# Patient Record
Sex: FEMALE | Race: OTHER | NOT HISPANIC OR LATINO | URBAN - METROPOLITAN AREA
[De-identification: names, ages, dates, MRNs, and addresses within clinical notes are randomized per-mention and may not be internally consistent; named-entity substitution may affect disease eponyms.]

---

## 2023-12-11 VITALS
HEIGHT: 65 IN | TEMPERATURE: 98 F | SYSTOLIC BLOOD PRESSURE: 139 MMHG | RESPIRATION RATE: 18 BRPM | DIASTOLIC BLOOD PRESSURE: 82 MMHG | HEART RATE: 75 BPM | OXYGEN SATURATION: 98 % | WEIGHT: 169.98 LBS

## 2023-12-11 LAB
ALBUMIN SERPL ELPH-MCNC: 4 G/DL — SIGNIFICANT CHANGE UP (ref 3.3–5)
ALP SERPL-CCNC: 165 U/L — HIGH (ref 40–120)
ALP SERPL-CCNC: SIGNIFICANT CHANGE UP (ref 40–120)
ALT FLD-CCNC: 22 U/L — SIGNIFICANT CHANGE UP (ref 10–45)
ALT FLD-CCNC: SIGNIFICANT CHANGE UP (ref 10–45)
ANION GAP SERPL CALC-SCNC: 11 MMOL/L — SIGNIFICANT CHANGE UP (ref 5–17)
ANION GAP SERPL CALC-SCNC: 12 MMOL/L — SIGNIFICANT CHANGE UP (ref 5–17)
APAP SERPL-MCNC: <5 UG/ML — LOW (ref 10–30)
APPEARANCE UR: CLEAR — SIGNIFICANT CHANGE UP
APTT BLD: 27.5 SEC — SIGNIFICANT CHANGE UP (ref 24.5–35.6)
AST SERPL-CCNC: 16 U/L — SIGNIFICANT CHANGE UP (ref 10–40)
AST SERPL-CCNC: SIGNIFICANT CHANGE UP (ref 10–40)
B-OH-BUTYR SERPL-SCNC: 1.4 MMOL/L — HIGH
BACTERIA # UR AUTO: ABNORMAL /HPF
BASE EXCESS BLDV CALC-SCNC: -4.8 MMOL/L — LOW (ref -2–3)
BASOPHILS # BLD AUTO: 0.03 K/UL — SIGNIFICANT CHANGE UP (ref 0–0.2)
BASOPHILS NFR BLD AUTO: 0.4 % — SIGNIFICANT CHANGE UP (ref 0–2)
BILIRUB SERPL-MCNC: 0.2 MG/DL — SIGNIFICANT CHANGE UP (ref 0.2–1.2)
BILIRUB UR-MCNC: NEGATIVE — SIGNIFICANT CHANGE UP
BUN SERPL-MCNC: 7 MG/DL — SIGNIFICANT CHANGE UP (ref 7–23)
BUN SERPL-MCNC: 8 MG/DL — SIGNIFICANT CHANGE UP (ref 7–23)
CA-I SERPL-SCNC: 1.16 MMOL/L — SIGNIFICANT CHANGE UP (ref 1.15–1.33)
CALCIUM SERPL-MCNC: 8.6 MG/DL — SIGNIFICANT CHANGE UP (ref 8.4–10.5)
CALCIUM SERPL-MCNC: 8.9 MG/DL — SIGNIFICANT CHANGE UP (ref 8.4–10.5)
CAST: 0 /LPF — SIGNIFICANT CHANGE UP (ref 0–4)
CHLORIDE SERPL-SCNC: 102 MMOL/L — SIGNIFICANT CHANGE UP (ref 96–108)
CO2 BLDV-SCNC: 20.7 MMOL/L — LOW (ref 22–26)
CO2 SERPL-SCNC: 19 MMOL/L — LOW (ref 22–31)
COLOR SPEC: YELLOW — SIGNIFICANT CHANGE UP
CREAT SERPL-MCNC: 0.43 MG/DL — LOW (ref 0.5–1.3)
CREAT SERPL-MCNC: 0.46 MG/DL — LOW (ref 0.5–1.3)
DIFF PNL FLD: NEGATIVE — SIGNIFICANT CHANGE UP
EGFR: 123 ML/MIN/1.73M2 — SIGNIFICANT CHANGE UP
EGFR: 125 ML/MIN/1.73M2 — SIGNIFICANT CHANGE UP
EOSINOPHIL # BLD AUTO: 0.1 K/UL — SIGNIFICANT CHANGE UP (ref 0–0.5)
EOSINOPHIL NFR BLD AUTO: 1.2 % — SIGNIFICANT CHANGE UP (ref 0–6)
ETHANOL SERPL-MCNC: <10 MG/DL — SIGNIFICANT CHANGE UP (ref 0–10)
GAS PNL BLDV: 132 MMOL/L — LOW (ref 136–145)
GAS PNL BLDV: SIGNIFICANT CHANGE UP
GLUCOSE SERPL-MCNC: 305 MG/DL — HIGH (ref 70–99)
GLUCOSE SERPL-MCNC: 378 MG/DL — HIGH (ref 70–99)
GLUCOSE UR QL: >=1000 MG/DL
HCG SERPL-ACNC: <0 MIU/ML — SIGNIFICANT CHANGE UP
HCO3 BLDV-SCNC: 20 MMOL/L — LOW (ref 22–29)
HCT VFR BLD CALC: 43.5 % — SIGNIFICANT CHANGE UP (ref 34.5–45)
HGB BLD-MCNC: 14.8 G/DL — SIGNIFICANT CHANGE UP (ref 11.5–15.5)
IMM GRANULOCYTES NFR BLD AUTO: 0.2 % — SIGNIFICANT CHANGE UP (ref 0–0.9)
INR BLD: 0.86 — SIGNIFICANT CHANGE UP (ref 0.85–1.18)
KETONES UR-MCNC: 40 MG/DL
LEUKOCYTE ESTERASE UR-ACNC: ABNORMAL
LYMPHOCYTES # BLD AUTO: 2.69 K/UL — SIGNIFICANT CHANGE UP (ref 1–3.3)
LYMPHOCYTES # BLD AUTO: 32.8 % — SIGNIFICANT CHANGE UP (ref 13–44)
MCHC RBC-ENTMCNC: 31.4 PG — SIGNIFICANT CHANGE UP (ref 27–34)
MCHC RBC-ENTMCNC: 34 GM/DL — SIGNIFICANT CHANGE UP (ref 32–36)
MCV RBC AUTO: 92.4 FL — SIGNIFICANT CHANGE UP (ref 80–100)
MONOCYTES # BLD AUTO: 0.55 K/UL — SIGNIFICANT CHANGE UP (ref 0–0.9)
MONOCYTES NFR BLD AUTO: 6.7 % — SIGNIFICANT CHANGE UP (ref 2–14)
NEUTROPHILS # BLD AUTO: 4.81 K/UL — SIGNIFICANT CHANGE UP (ref 1.8–7.4)
NEUTROPHILS NFR BLD AUTO: 58.7 % — SIGNIFICANT CHANGE UP (ref 43–77)
NITRITE UR-MCNC: NEGATIVE — SIGNIFICANT CHANGE UP
NRBC # BLD: 0 /100 WBCS — SIGNIFICANT CHANGE UP (ref 0–0)
PCO2 BLDV: 34 MMHG — LOW (ref 39–42)
PH BLDV: 7.37 — SIGNIFICANT CHANGE UP (ref 7.32–7.43)
PH UR: 6.5 — SIGNIFICANT CHANGE UP (ref 5–8)
PLATELET # BLD AUTO: 260 K/UL — SIGNIFICANT CHANGE UP (ref 150–400)
PO2 BLDV: 59 MMHG — HIGH (ref 25–45)
POTASSIUM BLDV-SCNC: 4.3 MMOL/L — SIGNIFICANT CHANGE UP (ref 3.5–5.1)
POTASSIUM SERPL-MCNC: 4.3 MMOL/L — SIGNIFICANT CHANGE UP (ref 3.5–5.3)
POTASSIUM SERPL-MCNC: SIGNIFICANT CHANGE UP (ref 3.5–5.3)
POTASSIUM SERPL-SCNC: 4.3 MMOL/L — SIGNIFICANT CHANGE UP (ref 3.5–5.3)
POTASSIUM SERPL-SCNC: SIGNIFICANT CHANGE UP (ref 3.5–5.3)
PROT SERPL-MCNC: 7 G/DL — SIGNIFICANT CHANGE UP (ref 6–8.3)
PROT SERPL-MCNC: 7.9 G/DL — SIGNIFICANT CHANGE UP (ref 6–8.3)
PROT UR-MCNC: NEGATIVE MG/DL — SIGNIFICANT CHANGE UP
PROTHROM AB SERPL-ACNC: 9.8 SEC — SIGNIFICANT CHANGE UP (ref 9.5–13)
RBC # BLD: 4.71 M/UL — SIGNIFICANT CHANGE UP (ref 3.8–5.2)
RBC # FLD: 13.5 % — SIGNIFICANT CHANGE UP (ref 10.3–14.5)
RBC CASTS # UR COMP ASSIST: 6 /HPF — HIGH (ref 0–4)
SALICYLATES SERPL-MCNC: <0.3 MG/DL — LOW (ref 2.8–20)
SAO2 % BLDV: 88.4 % — HIGH (ref 67–88)
SODIUM SERPL-SCNC: 132 MMOL/L — LOW (ref 135–145)
SODIUM SERPL-SCNC: 133 MMOL/L — LOW (ref 135–145)
SP GR SPEC: >1.03 — HIGH (ref 1–1.03)
SQUAMOUS # UR AUTO: 7 /HPF — HIGH (ref 0–5)
UROBILINOGEN FLD QL: 0.2 MG/DL — SIGNIFICANT CHANGE UP (ref 0.2–1)
WBC # BLD: 8.2 K/UL — SIGNIFICANT CHANGE UP (ref 3.8–10.5)
WBC # FLD AUTO: 8.2 K/UL — SIGNIFICANT CHANGE UP (ref 3.8–10.5)
WBC UR QL: 33 /HPF — HIGH (ref 0–5)

## 2023-12-11 PROCEDURE — 70551 MRI BRAIN STEM W/O DYE: CPT | Mod: 26,MA

## 2023-12-11 PROCEDURE — 70450 CT HEAD/BRAIN W/O DYE: CPT | Mod: 26,MA,XU

## 2023-12-11 PROCEDURE — 70498 CT ANGIOGRAPHY NECK: CPT | Mod: 26,MA

## 2023-12-11 PROCEDURE — 99291 CRITICAL CARE FIRST HOUR: CPT

## 2023-12-11 PROCEDURE — 70496 CT ANGIOGRAPHY HEAD: CPT | Mod: 26,MA

## 2023-12-11 PROCEDURE — 0042T: CPT | Mod: MA

## 2023-12-11 PROCEDURE — 93010 ELECTROCARDIOGRAM REPORT: CPT

## 2023-12-11 RX ORDER — ACETAMINOPHEN 500 MG
650 TABLET ORAL ONCE
Refills: 0 | Status: COMPLETED | OUTPATIENT
Start: 2023-12-11 | End: 2023-12-11

## 2023-12-11 RX ORDER — KETOROLAC TROMETHAMINE 30 MG/ML
15 SYRINGE (ML) INJECTION ONCE
Refills: 0 | Status: DISCONTINUED | OUTPATIENT
Start: 2023-12-11 | End: 2023-12-11

## 2023-12-11 RX ORDER — VALPROIC ACID (AS SODIUM SALT) 250 MG/5ML
500 SOLUTION, ORAL ORAL ONCE
Refills: 0 | Status: COMPLETED | OUTPATIENT
Start: 2023-12-11 | End: 2023-12-11

## 2023-12-11 RX ORDER — DIPHENHYDRAMINE HCL 50 MG
25 CAPSULE ORAL ONCE
Refills: 0 | Status: COMPLETED | OUTPATIENT
Start: 2023-12-11 | End: 2023-12-11

## 2023-12-11 RX ORDER — METOCLOPRAMIDE HCL 10 MG
10 TABLET ORAL ONCE
Refills: 0 | Status: COMPLETED | OUTPATIENT
Start: 2023-12-11 | End: 2023-12-11

## 2023-12-11 RX ORDER — INSULIN LISPRO 100/ML
4 VIAL (ML) SUBCUTANEOUS ONCE
Refills: 0 | Status: COMPLETED | OUTPATIENT
Start: 2023-12-11 | End: 2023-12-11

## 2023-12-11 RX ORDER — SODIUM CHLORIDE 9 MG/ML
1000 INJECTION INTRAMUSCULAR; INTRAVENOUS; SUBCUTANEOUS ONCE
Refills: 0 | Status: COMPLETED | OUTPATIENT
Start: 2023-12-11 | End: 2023-12-11

## 2023-12-11 RX ORDER — MAGNESIUM SULFATE 500 MG/ML
2 VIAL (ML) INJECTION ONCE
Refills: 0 | Status: COMPLETED | OUTPATIENT
Start: 2023-12-11 | End: 2023-12-11

## 2023-12-11 RX ORDER — INSULIN LISPRO 100/ML
6 VIAL (ML) SUBCUTANEOUS ONCE
Refills: 0 | Status: COMPLETED | OUTPATIENT
Start: 2023-12-11 | End: 2023-12-11

## 2023-12-11 RX ADMIN — Medication 15 MILLIGRAM(S): at 23:15

## 2023-12-11 RX ADMIN — Medication 4 UNIT(S): at 23:16

## 2023-12-11 RX ADMIN — Medication 104 MILLIGRAM(S): at 19:05

## 2023-12-11 RX ADMIN — Medication 650 MILLIGRAM(S): at 19:04

## 2023-12-11 RX ADMIN — Medication 55 MILLIGRAM(S): at 23:06

## 2023-12-11 RX ADMIN — SODIUM CHLORIDE 1000 MILLILITER(S): 9 INJECTION INTRAMUSCULAR; INTRAVENOUS; SUBCUTANEOUS at 19:06

## 2023-12-11 RX ADMIN — Medication 50 GRAM(S): at 19:04

## 2023-12-11 RX ADMIN — SODIUM CHLORIDE 1000 MILLILITER(S): 9 INJECTION INTRAMUSCULAR; INTRAVENOUS; SUBCUTANEOUS at 21:33

## 2023-12-11 NOTE — ED ADULT NURSE NOTE - NSFALLRISKINTERV_ED_ALL_ED
Assistance OOB with selected safe patient handling equipment if applicable/Assistance with ambulation/Communicate fall risk and risk factors to all staff, patient, and family/Monitor gait and stability/Provide visual cue: yellow wristband, yellow gown, etc/Reinforce activity limits and safety measures with patient and family/Call bell, personal items and telephone in reach/Instruct patient to call for assistance before getting out of bed/chair/stretcher/Non-slip footwear applied when patient is off stretcher/[place] to call system/Physically safe environment - no spills, clutter or unnecessary equipment/Purposeful Proactive Rounding/Room/bathroom lighting operational, light cord in reach Assistance OOB with selected safe patient handling equipment if applicable/Assistance with ambulation/Communicate fall risk and risk factors to all staff, patient, and family/Monitor gait and stability/Provide visual cue: yellow wristband, yellow gown, etc/Reinforce activity limits and safety measures with patient and family/Call bell, personal items and telephone in reach/Instruct patient to call for assistance before getting out of bed/chair/stretcher/Non-slip footwear applied when patient is off stretcher/[place] to call system/Physically safe environment - no spills, clutter or unnecessary equipment/Purposeful Proactive Rounding/Room/bathroom lighting operational, light cord in reach

## 2023-12-11 NOTE — ED PROVIDER NOTE - INTERPRETATION
normal sinus rhythm, Normal axis, Normal AK interval and QRS complex. There are no acute ischemic ST or T-wave changes. normal sinus rhythm, Normal axis, Normal MS interval and QRS complex. There are no acute ischemic ST or T-wave changes.

## 2023-12-11 NOTE — ED PROVIDER NOTE - OBJECTIVE STATEMENT
43F PMH DM, hemiplegic migraines p/w L HA for several weeks. Also slurred speech and RUE/RLE numbness since yesterday, unclear exact time of onset. Hx of similar prior. States she has seen neuro and has had MRIs in the past and diagnosed w/ hemiplegic migraine. Denies other systemic symptoms.   Denies vision changes, neck pain, rashes, tinnitus, hearing changes, URI symptoms, f/c, SOB/CP, NVD, abd pain, urinary complaints, black/bloody stool, lifestyle/dietary/medication changes.

## 2023-12-11 NOTE — CONSULT NOTE ADULT - ASSESSMENT
43y Female with PMHx of hemiplegic migraines, DM presents to [facility] ED for R hemiplegic migraine. Pt states she has had her migraine for at least 2 weeks, in left occipital location associated with right face/arm/leg weakness/numbness. Patient states this is her usual presentation of her hemiplegic migraines and goes to PT for it. At times her speech is also impacted by her migraines. Yesterday patient woke up in her USOH. Sometime in later afternoon patient developed paucities in her speech and patient was having difficulties with getting her words out, was frustrated with her speech. Pt states she does have these symptoms with her migraines at times but "it has never been this bad before." Patient states her speech worsens the more she puts effort into speaking. Symptoms persisted today, patient called her neurologist whom recommended her to be evaluated in ED. Last MRI was done in October, was unremarkable per patient. Stroke code called on arrival, NIHSS 9, CT imaging negative for acute intracranial pathology.     Pt presenting with R hemiplegic migraine associated with expressive aphasia, will require further testing to determine if symptoms due to stroke.     - Recommend obtaining MRI brain short stroke protocol to rule out ischemic stroke as cause of symptoms. If negative for infarct, patient will need admission due to right sided weakness.   - recommend Mg 2g IV for hemiplegic migraine treatement  - rest of care per primary team    Case discussed with Neurology Attending Dr. Diaz  43y Female with PMHx of hemiplegic migraines, DM presents to [facility] ED for R hemiplegic migraine. Pt states she has had her migraine for at least 2 weeks, in left occipital location associated with right face/arm/leg weakness/numbness. Patient states this is her usual presentation of her hemiplegic migraines and goes to PT for it. At times her speech is also impacted by her migraines. Yesterday patient woke up in her USOH. Sometime in later afternoon patient developed paucities in her speech and patient was having difficulties with getting her words out, was frustrated with her speech. Pt states she does have these symptoms with her migraines at times but "it has never been this bad before." Patient states her speech worsens the more she puts effort into speaking. Symptoms persisted today, patient called her neurologist whom recommended her to be evaluated in ED. Last MRI was done in October, was unremarkable per patient. Stroke code called on arrival, NIHSS 9, CT imaging negative for acute intracranial pathology.     Pt presenting with R hemiplegic migraine associated with expressive aphasia, will require further testing to determine if symptoms due to stroke.     - Recommend obtaining MRI brain short stroke protocol to rule out ischemic stroke as cause of symptoms. If negative for infarct, patient will need admission due to right sided weakness.   - recommend Mg 2g IV for hemiplegic migraine treatement  - rest of care per primary team    Case discussed with Neurology Attending Dr. Diaz

## 2023-12-11 NOTE — ED PROVIDER NOTE - CLINICAL SUMMARY MEDICAL DECISION MAKING FREE TEXT BOX
43F PMH DM, hemiplegic migraines p/w L HA for several weeks. Also slurred speech and RUE/RLE numbness since yesterday, unclear exact time of onset. Hx of similar prior. States she has seen neuro and has had MRIs in the past and diagnosed w/ hemiplegic migraine. Denies other systemic symptoms.   Vitals wnl, exam as above.  ddx: Possible CVA vs. migraine.   Code stroke activated prior to my eval.   Labs, CTs, IVF/attempt symptom control, reassess.

## 2023-12-11 NOTE — ED PROVIDER NOTE - PROGRESS NOTE DETAILS
Klepfish: Na 133, bicarb 19, glucose 378, K/alk phos/AST/ALT hemolyzed. Other labs grossly wnl. UA w/ small leuk esterase, 33 WBCs, 7 epithelial cells, 40 ketone. CTs w/ no acute pathology. Pt currently at MRI.   Possible mild DKA, will also reassess for urinary symptoms. Will reassess. [place]: MRI w/ no acute pathology. Pt states she still has HA and neuro deficits still present on exam. Rediscussed w/ stroke team, recommending gen neuro, clinically not CVA. Pt does not want any additional pain meds at this time, has no urinary symptoms, unlikely UTI. Gen neuro consulted.  Pending: Rpt labs, gen neuro call back. Updated pt. Will reassess. [place]: Normal pH, clinically not DKA. Rpt CMP w/ Na 132, bicarb 19, glucose 305, alk phos 165. Will give insulin. Still getting IVF. Evaluated by neuro, recommending toradol and depakote. Will reassess.  Pending: reassessment/rediscuss w/ neuro. Possible dc vs. admit based on pt's smyptoms. Klepfish: Normal pH, beta hydroxy minimally increased to 1.5. Rpt CMP w/ Na 132, bicarb 19, glucose 305, alk phos 165. Clinically not DKA (normal pH, no symptoms of DKA). Will give insulin. Still getting IVF. Evaluated by neuro, recommending toradol and depakote. Will reassess.  Pending: reassessment/rediscuss w/ neuro. Possible dc vs. admit based on pt's symptoms. [place]: pt remains unchanged, well appearing. rediscussed w/ neuro, recommending reassessment in ~1hr. anjum / diann - received on sign out. pt pending neuro reeval after meds,   pt still w significant headache and neuro exam unchanged. will admit to neuro for further management.

## 2023-12-11 NOTE — CONSULT NOTE ADULT - SUBJECTIVE AND OBJECTIVE BOX
NEUROLOGY CONSULT    HPI:  43F w/ PMH of asthma, DMII, possible absence seizures (pt not completely sure)and Migraines with R sided hemiplegia who presents for constant pounding, stabbing headache on the right side of her head for two weeks. She notes that within a few hours of her headache onset she developed R sided facial weakness and numbness that progressed to R UE and R LE numbness and weakness. The migraine is associated with nausea, photophobia and phonophobia. She has had migraines like this before in the past. The last time it was this bad was in october this year she was in [place] at the time and admitted to [facility] and had a brain MRI done and EEG and results were all normal. She is taking toradol injections at home along with keppra 250 mg, metoprolol, for her headaches and it has not helped her. She notes that she previously tried topiramate for her headaches but experiences tingling sensations so her doctor stopped the medication. She also notes that her doctor told her that she cannot be on any triptan medications due to the hemiplegia she experiences with her migraines. Code stroke was called in the ED and head CT and CTA H&N were negative. MRI short stroke protocol with DWI ws done and did not show evidence of acute ischemic infarct.      MEDICATIONS  Home Medications:    MEDICATIONS  (STANDING):  insulin lispro Injectable (ADMELOG) 6 Unit(s) SubCutaneous Once  ketorolac   Injectable 15 milliGRAM(s) IV Push Once  valproate sodium  IVPB 500 milliGRAM(s) IV Intermittent Once    MEDICATIONS  (PRN):      FAMILY HISTORY: maternal grandmother stroke and migraines, no hx of seizures in family.     SOCIAL HISTORY: negative for tobacco, alcohol, or ilicit drug use.    Allergies    No Known Allergies    Intolerances        NEURO:   MENTAL STATUS: AAOx3  LANG/SPEECH: impaired fluency speech with paucities, intact naming, repetition & comprehension  CRANIAL NERVES:  II: Pupils equal and reactive, no RAPD, normal visual field  III, IV, VI: EOM intact, no gaze preference or deviation  V: normal  VII: R upper and lower facial weakness and decreased sensation  VIII: normal hearing to speech  MOTOR: 5/5 in l UE and L LE, R UE 4-/5, R LE 3/5  REFLEXES: 2/4 throughout, bilateral flexor plantars  SENSORY: loss of sensation on R Ue and R LE to light touch, vibration and pin prick,   COORD: Normal finger to nose and heel to shin, no tremor, no dysmetria on R UE and R LE, L Ue no dysmetria but limited by weakness. R LE unable to perform HTS due to weakness.  Gait: deferred.       LABS:                        14.8   8.20  )-----------( 260      ( 11 Dec 2023 17:07 )             43.5     12-11    132<L>  |  102  |  7   ----------------------------<  305<H>  4.3   |  19<L>  |  0.43<L>    Ca    8.6      11 Dec 2023 21:59    TPro  7.0  /  Alb  4.0  /  TBili  0.2  /  DBili  x   /  AST  16  /  ALT  22  /  AlkPhos  165<H>  12-11    Hemoglobin A1C:   Vitamin B12   PT/INR - ( 11 Dec 2023 20:07 )   PT: 9.8 sec;   INR: 0.86          PTT - ( 11 Dec 2023 20:07 )  PTT:27.5 sec  CAPILLARY BLOOD GLUCOSE  325 (11 Dec 2023 18:29)      POCT Blood Glucose.: 325 mg/dL (11 Dec 2023 17:04)      Urinalysis Basic - ( 11 Dec 2023 21:59 )    Color: x / Appearance: x / SG: x / pH: x  Gluc: 305 mg/dL / Ketone: x  / Bili: x / Urobili: x   Blood: x / Protein: x / Nitrite: x   Leuk Esterase: x / RBC: x / WBC x   Sq Epi: x / Non Sq Epi: x / Bacteria: x            Microbiology:      RADIOLOGY, EKG AND ADDITIONAL TESTS: Reviewed.           NEUROLOGY CONSULT    HPI:  43F w/ PMH of asthma, DMII, possible absence seizures (pt not completely sure)and Migraines with R sided hemiplegia who presents for constant pounding, stabbing headache on the right side of her head for two weeks. She notes that within a few hours of her headache onset she developed R sided facial weakness and numbness that progressed to R UE and R LE numbness and weakness. The migraine is associated with nausea, photophobia and phonophobia. She has had migraines like this before in the past. The last time it was this bad was in october this year she was in [place] at the time and admitted to [facility] and had a brain MRI done and EEG and results were all normal. She is taking toradol injections at home along with keppra 250 mg, metoprolol, for her headaches and it has not helped her. She notes that she previously tried topiramate for her headaches but experiences tingling sensations so her doctor stopped the medication. She also notes that her doctor told her that she cannot be on any triptan medications due to the hemiplegia she experiences with her migraines. Code stroke was called in the ED and head CT and CTA H&N were negative. MRI short stroke protocol with DWI ws done and did not show evidence of acute ischemic infarct.      MEDICATIONS  Home Medications:    MEDICATIONS  (STANDING):  insulin lispro Injectable (ADMELOG) 6 Unit(s) SubCutaneous Once  ketorolac   Injectable 15 milliGRAM(s) IV Push Once  valproate sodium  IVPB 500 milliGRAM(s) IV Intermittent Once    MEDICATIONS  (PRN):      FAMILY HISTORY: maternal grandmother stroke and migraines, no hx of seizures in family.     SOCIAL HISTORY: negative for tobacco, alcohol, or ilicit drug use.    Allergies    No Known Allergies    Intolerances        NEURO:   MENTAL STATUS: AAOx3  LANG/SPEECH: impaired fluency speech with paucities, intact naming, repetition & comprehension  CRANIAL NERVES:  II: Pupils equal and reactive, no RAPD, normal visual field  III, IV, VI: EOM intact, no gaze preference or deviation  V: normal  VII: R upper and lower facial weakness and decreased sensation  VIII: normal hearing to speech  MOTOR: 5/5 in l UE and L LE, R UE 4-/5, R LE 3/5  REFLEXES: 2/4 throughout, bilateral flexor plantars  SENSORY: loss of sensation on R Ue and R LE to light touch, vibration and pin prick,   COORD: Normal finger to nose and heel to shin, no tremor, no dysmetria on R UE and R LE, L Ue no dysmetria but limited by weakness. R LE unable to perform HTS due to weakness.  Gait: deferred.       LABS:                        14.8   8.20  )-----------( 260      ( 11 Dec 2023 17:07 )             43.5     12-11    132<L>  |  102  |  7   ----------------------------<  305<H>  4.3   |  19<L>  |  0.43<L>    Ca    8.6      11 Dec 2023 21:59    TPro  7.0  /  Alb  4.0  /  TBili  0.2  /  DBili  x   /  AST  16  /  ALT  22  /  AlkPhos  165<H>  12-11    Hemoglobin A1C:   Vitamin B12   PT/INR - ( 11 Dec 2023 20:07 )   PT: 9.8 sec;   INR: 0.86          PTT - ( 11 Dec 2023 20:07 )  PTT:27.5 sec  CAPILLARY BLOOD GLUCOSE  325 (11 Dec 2023 18:29)      POCT Blood Glucose.: 325 mg/dL (11 Dec 2023 17:04)      Urinalysis Basic - ( 11 Dec 2023 21:59 )    Color: x / Appearance: x / SG: x / pH: x  Gluc: 305 mg/dL / Ketone: x  / Bili: x / Urobili: x   Blood: x / Protein: x / Nitrite: x   Leuk Esterase: x / RBC: x / WBC x   Sq Epi: x / Non Sq Epi: x / Bacteria: x            Microbiology:      RADIOLOGY, EKG AND ADDITIONAL TESTS: Reviewed.           NEUROLOGY CONSULT    HPI:  43F w/ PMH of asthma, DMII, possible absence seizures (pt not completely sure)and Migraines with R sided hemiplegia who presents for constant pounding, stabbing headache on the right side of her head for two weeks. She notes that within a few hours of her headache onset she developed R sided facial weakness and numbness that progressed to R UE and R LE numbness and weakness. The migraine is associated with nausea, photophobia and phonophobia. She has had migraines like this before in the past. The last time it was this bad was in october this year she was in [place] at the time and admitted to [facility] and had a brain MRI done and EEG and results were all normal. She is taking toradol injections at home along with keppra 250 mg, metoprolol, for her headaches and it has not helped her. She notes that she previously tried topiramate for her headaches but experiences tingling sensations so her doctor stopped the medication. She also notes that her doctor told her that she cannot be on any triptan medications due to the hemiplegia she experiences with her migraines. Code stroke was called in the ED and head CT and CTA H&N were negative. MRI short stroke protocol with DWI ws done and did not show evidence of acute ischemic infarct.      MEDICATIONS  Home Medications:    MEDICATIONS  (STANDING):  insulin lispro Injectable (ADMELOG) 6 Unit(s) SubCutaneous Once  ketorolac   Injectable 15 milliGRAM(s) IV Push Once  valproate sodium  IVPB 500 milliGRAM(s) IV Intermittent Once    MEDICATIONS  (PRN):      FAMILY HISTORY: maternal grandmother stroke and migraines, no hx of seizures in family.     SOCIAL HISTORY: negative for tobacco, alcohol, or ilicit drug use.    Allergies    No Known Allergies    Intolerances        NEURO:   MENTAL STATUS: AAOx3  LANG/SPEECH: impaired fluency speech with paucities, intact naming, repetition & comprehension  CRANIAL NERVES:  II: Pupils equal and reactive, no RAPD, normal visual field  III, IV, VI: EOM intact, no gaze preference or deviation  V: normal  VII: R upper and lower facial weakness and decreased sensation  VIII: normal hearing to speech  MOTOR: 5/5 in l UE and L LE, R UE 4-/5, R LE 3/5  REFLEXES: 2/4 throughout, bilateral flexor plantars  SENSORY: loss of sensation on R Ue and R LE to light touch, vibration and pin prick,   COORD: Normal finger to nose and heel to shin, no tremor, no dysmetria on R UE and R LE, L Ue no dysmetria but limited by weakness. R LE unable to perform HTS due to weakness.  Gait: deferred.       LABS:                        14.8   8.20  )-----------( 260      ( 11 Dec 2023 17:07 )             43.5     12-11    132<L>  |  102  |  7   ----------------------------<  305<H>  4.3   |  19<L>  |  0.43<L>    Ca    8.6      11 Dec 2023 21:59    TPro  7.0  /  Alb  4.0  /  TBili  0.2  /  DBili  x   /  AST  16  /  ALT  22  /  AlkPhos  165<H>  12-11    Hemoglobin A1C:   Vitamin B12   PT/INR - ( 11 Dec 2023 20:07 )   PT: 9.8 sec;   INR: 0.86          PTT - ( 11 Dec 2023 20:07 )  PTT:27.5 sec  CAPILLARY BLOOD GLUCOSE  325 (11 Dec 2023 18:29)      POCT Blood Glucose.: 325 mg/dL (11 Dec 2023 17:04)      Urinalysis Basic - ( 11 Dec 2023 21:59 )    Color: x / Appearance: x / SG: x / pH: x  Gluc: 305 mg/dL / Ketone: x  / Bili: x / Urobili: x   Blood: x / Protein: x / Nitrite: x   Leuk Esterase: x / RBC: x / WBC x   Sq Epi: x / Non Sq Epi: x / Bacteria: x            Microbiology:      RADIOLOGY, EKG AND ADDITIONAL TESTS: Reviewed.           NEUROLOGY CONSULT    HPI:  43F w/ PMH of asthma, DMII, possible absence seizures (pt not completely sure)and Migraines with R sided hemiplegia who presents for constant pounding, stabbing headache on the right side of her head for two weeks. She notes that within a few hours of her headache onset she developed R sided facial weakness and numbness that progressed to R UE and R LE numbness and weakness. The migraine is associated with nausea, photophobia and phonophobia. She has had migraines like this before in the past. The last time it was this bad was in october this year she was in [place] at the time and admitted to [facility] and had a brain MRI done and EEG and results were all normal. She is taking toradol injections at home along with keppra 250 mg, metoprolol, for her headaches and it has not helped her. She notes that she previously tried topiramate for her headaches but experiences tingling sensations so her doctor stopped the medication. She also notes that her doctor told her that she cannot be on any triptan medications due to the hemiplegia she experiences with her migraines. Code stroke was called in the ED and head CT and CTA H&N were negative. MRI short stroke protocol with DWI ws done and did not show evidence of acute ischemic infarct.      MEDICATIONS  Home Medications:    MEDICATIONS  (STANDING):  insulin lispro Injectable (ADMELOG) 6 Unit(s) SubCutaneous Once  ketorolac   Injectable 15 milliGRAM(s) IV Push Once  valproate sodium  IVPB 500 milliGRAM(s) IV Intermittent Once    MEDICATIONS  (PRN):      FAMILY HISTORY: maternal grandmother stroke and migraines, no hx of seizures in family.     SOCIAL HISTORY: negative for tobacco, alcohol, or ilicit drug use.    Allergies    No Known Allergies    Intolerances        NEURO:   MENTAL STATUS: AAOx3  LANG/SPEECH: impaired fluency speech with paucities, intact naming, repetition & comprehension  CRANIAL NERVES:  II: Pupils equal and reactive, no RAPD, normal visual field  III, IV, VI: EOM intact, no gaze preference or deviation  V: normal  VII: R upper and lower facial weakness and decreased sensation  VIII: normal hearing to speech  MOTOR: 5/5 in l UE and L LE, R UE 4-/5, R LE 3/5  REFLEXES: 2/4 throughout, bilateral flexor plantars  SENSORY: loss of sensation on R Ue and R LE to light touch, vibration and pin prick,   COORD: Normal finger to nose and heel to shin, no tremor, no dysmetria on R UE and R LE, L Ue no dysmetria but limited by weakness. R LE unable to perform HTS due to weakness.  Gait: deferred.       LABS:                        14.8   8.20  )-----------( 260      ( 11 Dec 2023 17:07 )             43.5     12-11    132<L>  |  102  |  7   ----------------------------<  305<H>  4.3   |  19<L>  |  0.43<L>    Ca    8.6      11 Dec 2023 21:59    TPro  7.0  /  Alb  4.0  /  TBili  0.2  /  DBili  x   /  AST  16  /  ALT  22  /  AlkPhos  165<H>  12-11    Hemoglobin A1C:   Vitamin B12   PT/INR - ( 11 Dec 2023 20:07 )   PT: 9.8 sec;   INR: 0.86          PTT - ( 11 Dec 2023 20:07 )  PTT:27.5 sec  CAPILLARY BLOOD GLUCOSE  325 (11 Dec 2023 18:29)      POCT Blood Glucose.: 325 mg/dL (11 Dec 2023 17:04)      Urinalysis Basic - ( 11 Dec 2023 21:59 )    Color: x / Appearance: x / SG: x / pH: x  Gluc: 305 mg/dL / Ketone: x  / Bili: x / Urobili: x   Blood: x / Protein: x / Nitrite: x   Leuk Esterase: x / RBC: x / WBC x   Sq Epi: x / Non Sq Epi: x / Bacteria: x            Microbiology:      RADIOLOGY, EKG AND ADDITIONAL TESTS: Reviewed.

## 2023-12-11 NOTE — CONSULT NOTE ADULT - ASSESSMENT
43F w/ PMH of asthma, DMII, possible absence seizures (pt not completely sure)and Migraines with R sided hemiplegia who presents for migraine headache associated with R sided hemiplegia. CT head and CTA H&N were negative. Stroke team evaluated patient and MRI DWI was also negative for acute infarct. Patient's symptoms are likely due to Migraine with hemiplegia.     Recommendations  - IV toradol 30 mg  - IV depakote 500 mg   - re-assess for symptom improvement after medications are given  - Reglan prn for nausea    Case discussed with Dr. Troncoso     43F w/ PMH of asthma, DMII, possible absence seizures (pt not completely sure)and Migraines with R sided hemiplegia who presents for migraine headache associated with R sided hemiplegia. CT head and CTA H&N were negative. Stroke team evaluated patient and MRI DWI was also negative for acute infarct. Patient's symptoms are likely due to Migraine with hemiplegia.     Recommendations  - IV toradol 30 mg  - IV depakote 500 mg   - NS IVF  - re-assess for symptom improvement after medications are given  - Reglan prn for nausea    Case discussed with Dr. Troncoso

## 2023-12-11 NOTE — ED ADULT NURSE NOTE - CHIEF COMPLAINT QUOTE
Pt hx hemiplegic migraines reports she is followed by  Neurology to ED c/o altered speech x 2 wks, worse since yesterday afternoon (cannot define exact time). "My mom said she cannot even understand my speech now". Pt noted to have R facial droop and R sided LE weakness, RUE tremor unable to articulate in clear sentences, endorsing 10/10 HA to R occipital lobe. pt denies prior strokes. A&xox4. Pt immediately STROKE UPGD to MD Jo. FS obtained.

## 2023-12-11 NOTE — CONSULT NOTE ADULT - NSCONSULTADDITIONALINFOA_GEN_ALL_CORE
MRI brain nonconstrast completed. Appears negative for acute infarction. If patient remains symptomatic with right sided deficits, recommend general neurology consult for hemiplegic migraine management. Stroke will sign off.
.

## 2023-12-11 NOTE — ED PROVIDER NOTE - PHYSICAL EXAMINATION
R facial droop, R ptosis  Decreased R shoulder shrug, Strength RUE 3/5.   PERRL, EOMI, no nystagmus. Other CN intact. Sensation intact.   Slight slurred speech.  No temporal ttp, no nuchal rigidity.

## 2023-12-11 NOTE — ED ADULT TRIAGE NOTE - CHIEF COMPLAINT QUOTE
Pt hx hemiplegic migraines reports she is followed by  Neurology to ED c/o altered speech x 2 wks, worse since yesterday afternoon (cannot define exact time). "My mom said she cannot even understand my speech now". Pt noted to have R facial droop and R sided LE weakness, RUE tremor unable to articulate in clear sentences, endorsing 10/10 HA to R occipital lobe. pt denies prior strokes. A&xox4. Pt immediately STROKE UPGD to MD [facility]. FS obtained.
home

## 2023-12-11 NOTE — ED ADULT NURSE NOTE - OBJECTIVE STATEMENT
Pt arrived to ED c/o slurred speech. Pt hx of hemiplegic migraines. Pt reports weakness and numbness to the right side with R facial droop, RUE tremor for x2 weeks. Pt new onset of altered speech since yesterday (no exact time). denies hx of strokes.

## 2023-12-11 NOTE — CONSULT NOTE ADULT - SUBJECTIVE AND OBJECTIVE BOX
**STROKE CODE CONSULT NOTE**    Last known well time/Time of onset of symptoms:    HPI: 43y Female with PMHx of hemiplegic migraines, DM presents to [facility] ED for R hemiplegic migraine. Pt states she has had her migraine for at least 2 weeks, in left occipital location associated with right face/arm/leg weakness/numbness. Patient states this is her usual presentation of her hemiplegic migraines and goes to PT for it. At times her speech is also impacted by her migraines. Yesterday patient woke up in her USOH. Sometime in later afternoon patient developed paucities in her speech and patient was having difficulties with getting her words out, was frustrated with her speech. Pt states she does have these symptoms with her migraines at times but "it has never been this bad before." Patient states her speech worsens the more she puts effort into speaking. Symptoms persisted today, patient called her neurologist whom recommended her to be evaluated in ED. Last MRI was done in October, was unremarkable per patient. Stroke code called on arrival, NIHSS 9, CT imaging negative for acute intracranial pathology. Patient denies vision changes, dizziness, chest pain, sob, abd pain.       T(C): 36.9 (12-11-23 @ 17:04), Max: 36.9 (12-11-23 @ 17:04)  HR: 75 (12-11-23 @ 17:04) (75 - 75)  BP: 139/82 (12-11-23 @ 17:04) (139/82 - 139/82)  RR: 18 (12-11-23 @ 17:04) (18 - 18)  SpO2: 98% (12-11-23 @ 17:04) (98% - 98%)    PAST MEDICAL & SURGICAL HISTORY:    FAMILY HISTORY:    SOCIAL HISTORY:   Smoking status:  Drinking:  Drug Use:     ROS:   Constitutional: No fever, weight loss or fatigue  Eyes: No eye pain, visual disturbances, or discharge  ENMT:  No difficulty hearing, tinnitus; No sinus or throat pain  Neck: No pain or stiffness  Respiratory: No cough, wheezing, chills or hemoptysis  Cardiovascular: No chest pain, palpitations, shortness of breath, or leg swelling  Gastrointestinal: No abdominal pain. No nausea, vomiting  Genitourinary: No dysuria, frequency, hematuria or incontinence  Neurological: As per HPI    MEDICATIONS  (STANDING):  acetaminophen     Tablet .. 650 milliGRAM(s) Oral once  diphenhydrAMINE Injectable 25 milliGRAM(s) IV Push once  magnesium sulfate  IVPB 2 Gram(s) IV Intermittent Once  metoclopramide  IVPB 10 milliGRAM(s) IV Intermittent Once  sodium chloride 0.9% Bolus 1000 milliLiter(s) IV Bolus once    MEDICATIONS  (PRN):    Allergies    No Known Allergies    Intolerances      Vital Signs Last 24 Hrs  T(C): 36.9 (11 Dec 2023 17:04), Max: 36.9 (11 Dec 2023 17:04)  T(F): 98.4 (11 Dec 2023 17:04), Max: 98.4 (11 Dec 2023 17:04)  HR: 75 (11 Dec 2023 17:04) (75 - 75)  BP: 139/82 (11 Dec 2023 17:04) (139/82 - 139/82)  BP(mean): --  RR: 18 (11 Dec 2023 17:04) (18 - 18)  SpO2: 98% (11 Dec 2023 17:04) (98% - 98%)    Parameters below as of 11 Dec 2023 17:04  Patient On (Oxygen Delivery Method): room air    Physical exam:  Constitutional: No acute distress, conversant  Eyes: Anicteric sclerae, moist conjunctivae, see below for CNs  Neck: trachea midline  Cardiovascular: Regular rate and rhythm  Pulmonary: No use of accessory muscles    Neurologic:  -Mental status: Awake, alert, oriented to person, place, and time. Speech with paucities, intact naming, no dysarthria noted. Recent and remote memory intact. Follows commands. Attention/concentration intact. Fund of knowledge appropriate.  -Cranial nerves:   II: Visual fields are full to confrontation.  III, IV, VI: Extraocular movements are intact without nystagmus. Pupils equally round and reactive to light  V:  Right V1-V3 10% sensation compared to left.   VII: Right eyelid and lower face weakness.  XII: Tongue protrudes midline  Motor: Normal bulk and tone. No pronator drift. RUE downward drift just hits bed at 10 seconds 3/5, RLE just antigravity briefly then hits bed. Left side 5/5 throughout.   Sensation: RUE/RLE 10% sensation compared to left. No neglect or extinction on double simultaneous testing.  Coordination: No dysmetria on finger-to-nose and heel-to-shin on left side, no obvious dysmetria out of proportion to weakness in RUE.   Reflexes: Downgoing toes bilaterally   Gait: Right leg dragging behind her     NIHSS: 9    Fingerstick Blood Glucose: CAPILLARY BLOOD GLUCOSE  325 (11 Dec 2023 18:29)      POCT Blood Glucose.: 325 mg/dL (11 Dec 2023 17:04)    LABS:              RADIOLOGY & ADDITIONAL STUDIES:  < from: CT Brain Stroke Protocol (12.11.23 @ 17:26) >  IMPRESSION:  No acute intracranial hemorrhage or large demarcated territorial infarct.    < end of copied text >    < from: CT Brain Perfusion Maps Stroke/ CT Angio Head and Neck with IV contrast  (12.11.23 @ 17:45) >  IMPRESSION: Unremarkable noncontrast brain CT. Normal CT perfusion.   Normal CTA of the head and neck. No large vessel occlusion.    < end of copied text >    -----------------------------------------------------------------------------------------------------------------  IV-tPA (Y/N):   N                         Bolus time:    Tenecteplase Dose Verification w/ RN:  Reason IV-tPA not given: out of window     **STROKE CODE CONSULT NOTE**    Last known well time/Time of onset of symptoms:    HPI: 43y Female with PMHx of hemiplegic migraines, DM presents to [facility] ED for R hemiplegic migraine. Pt states she has had her migraine for at least 2 weeks, in left occipital location associated with right face/arm/leg weakness/numbness. Patient states this is her usual presentation of her hemiplegic migraines and goes to PT for it. At times her speech is also impacted by her migraines. Yesterday patient woke up in her USOH. Sometime in later afternoon patient developed paucities in her speech and patient was having difficulties with getting her words out, was frustrated with her speech. Pt states she does have these symptoms with her migraines at times but "it has never been this bad before." Patient states her speech worsens the more she puts effort into speaking. Symptoms persisted today, patient called her neurologist whom recommended her to be evaluated in ED. Last MRI was done in October, was unremarkable per patient. Stroke code called on arrival, NIHSS 9, CT imaging negative for acute intracranial pathology. Patient denies vision changes, dizziness, chest pain, sob, abd pain.       T(C): 36.9 (12-11-23 @ 17:04), Max: 36.9 (12-11-23 @ 17:04)  HR: 75 (12-11-23 @ 17:04) (75 - 75)  BP: 139/82 (12-11-23 @ 17:04) (139/82 - 139/82)  RR: 18 (12-11-23 @ 17:04) (18 - 18)  SpO2: 98% (12-11-23 @ 17:04) (98% - 98%)    PAST MEDICAL & SURGICAL HISTORY:    FAMILY HISTORY:    SOCIAL HISTORY:   Smoking status:  Drinking:  Drug Use:     ROS:   Constitutional: No fever, weight loss or fatigue  Eyes: No eye pain, visual disturbances, or discharge  ENMT:  No difficulty hearing, tinnitus; No sinus or throat pain  Neck: No pain or stiffness  Respiratory: No cough, wheezing, chills or hemoptysis  Cardiovascular: No chest pain, palpitations, shortness of breath, or leg swelling  Gastrointestinal: No abdominal pain. No nausea, vomiting  Genitourinary: No dysuria, frequency, hematuria or incontinence  Neurological: As per HPI    MEDICATIONS  (STANDING):  acetaminophen     Tablet .. 650 milliGRAM(s) Oral once  diphenhydrAMINE Injectable 25 milliGRAM(s) IV Push once  magnesium sulfate  IVPB 2 Gram(s) IV Intermittent Once  metoclopramide  IVPB 10 milliGRAM(s) IV Intermittent Once  sodium chloride 0.9% Bolus 1000 milliLiter(s) IV Bolus once    MEDICATIONS  (PRN):    Allergies    No Known Allergies    Intolerances      Vital Signs Last 24 Hrs  T(C): 36.9 (11 Dec 2023 17:04), Max: 36.9 (11 Dec 2023 17:04)  T(F): 98.4 (11 Dec 2023 17:04), Max: 98.4 (11 Dec 2023 17:04)  HR: 75 (11 Dec 2023 17:04) (75 - 75)  BP: 139/82 (11 Dec 2023 17:04) (139/82 - 139/82)  BP(mean): --  RR: 18 (11 Dec 2023 17:04) (18 - 18)  SpO2: 98% (11 Dec 2023 17:04) (98% - 98%)    Parameters below as of 11 Dec 2023 17:04  Patient On (Oxygen Delivery Method): room air    Physical exam:  Constitutional: No acute distress, conversant  Eyes: Anicteric sclerae, moist conjunctivae, see below for CNs  Neck: trachea midline  Cardiovascular: Regular rate and rhythm  Pulmonary: No use of accessory muscles    Neurologic:  -Mental status: Awake, alert, oriented to person, place, and time. Speech with paucities, intact naming, no dysarthria noted. Recent and remote memory intact. Follows commands. Attention/concentration intact. Fund of knowledge appropriate.  -Cranial nerves:   II: Visual fields are full to confrontation.  III, IV, VI: Extraocular movements are intact without nystagmus. Pupils equally round and reactive to light  V:  Right V1-V3 10% sensation compared to left.   VII: Right eyelid and lower face weakness.  XII: Tongue protrudes midline  Motor: Normal bulk and tone. No pronator drift. RUE downward drift just hits bed at 10 seconds 3/5, RLE just antigravity briefly then hits bed. Left side 5/5 throughout.   Sensation: RUE/RLE 10% sensation compared to left. No neglect or extinction on double simultaneous testing.  Coordination: No dysmetria on finger-to-nose and heel-to-shin on left side, no obvious dysmetria out of proportion to weakness in RUE.   Reflexes: Downgoing toes bilaterally   Gait: Right leg dragging behind her     NIHSS: 9    Fingerstick Blood Glucose: CAPILLARY BLOOD GLUCOSE  325 (11 Dec 2023 18:29)      POCT Blood Glucose.: 325 mg/dL (11 Dec 2023 17:04)    LABS:              RADIOLOGY & ADDITIONAL STUDIES:  < from: CT Brain Stroke Protocol (12.11.23 @ 17:26) >  IMPRESSION:  No acute intracranial hemorrhage or large demarcated territorial infarct.    < end of copied text >    < from: CT Brain Perfusion Maps Stroke/ CT Angio Head and Neck with IV contrast  (12.11.23 @ 17:45) >  IMPRESSION: Unremarkable noncontrast brain CT. Normal CT perfusion.   Normal CTA of the head and neck. No large vessel occlusion.    < end of copied text >    -----------------------------------------------------------------------------------------------------------------  IV-tPA (Y/N):   N                         Bolus time:    Tenecteplase Dose Verification w/ RN:  Reason IV-tPA not given: out of window

## 2023-12-12 ENCOUNTER — INPATIENT (INPATIENT)
Facility: HOSPITAL | Age: 43
LOS: 1 days | Discharge: HOME | DRG: 103 | End: 2023-12-14
Attending: STUDENT IN AN ORGANIZED HEALTH CARE EDUCATION/TRAINING PROGRAM | Admitting: STUDENT IN AN ORGANIZED HEALTH CARE EDUCATION/TRAINING PROGRAM
Payer: COMMERCIAL

## 2023-12-12 DIAGNOSIS — E11.9 TYPE 2 DIABETES MELLITUS WITHOUT COMPLICATIONS: ICD-10-CM

## 2023-12-12 LAB
A1C WITH ESTIMATED AVERAGE GLUCOSE RESULT: 10.7 % — HIGH (ref 4–5.6)
ALBUMIN SERPL ELPH-MCNC: 3.8 G/DL — SIGNIFICANT CHANGE UP (ref 3.3–5)
ALP SERPL-CCNC: 142 U/L — HIGH (ref 40–120)
ALT FLD-CCNC: 20 U/L — SIGNIFICANT CHANGE UP (ref 10–45)
AMPHET UR-MCNC: NEGATIVE — SIGNIFICANT CHANGE UP
ANION GAP SERPL CALC-SCNC: 8 MMOL/L — SIGNIFICANT CHANGE UP (ref 5–17)
AST SERPL-CCNC: 12 U/L — SIGNIFICANT CHANGE UP (ref 10–40)
B-OH-BUTYR SERPL-SCNC: 0.3 MMOL/L — SIGNIFICANT CHANGE UP
BARBITURATES UR SCN-MCNC: NEGATIVE — SIGNIFICANT CHANGE UP
BENZODIAZ UR-MCNC: NEGATIVE — SIGNIFICANT CHANGE UP
BILIRUB SERPL-MCNC: 0.2 MG/DL — SIGNIFICANT CHANGE UP (ref 0.2–1.2)
BUN SERPL-MCNC: 5 MG/DL — LOW (ref 7–23)
CALCIUM SERPL-MCNC: 8.2 MG/DL — LOW (ref 8.4–10.5)
CHLORIDE SERPL-SCNC: 108 MMOL/L — SIGNIFICANT CHANGE UP (ref 96–108)
CO2 SERPL-SCNC: 20 MMOL/L — LOW (ref 22–31)
COCAINE METAB.OTHER UR-MCNC: NEGATIVE — SIGNIFICANT CHANGE UP
CREAT SERPL-MCNC: 0.44 MG/DL — LOW (ref 0.5–1.3)
EGFR: 125 ML/MIN/1.73M2 — SIGNIFICANT CHANGE UP
ESTIMATED AVERAGE GLUCOSE: 260 MG/DL — HIGH (ref 68–114)
GLUCOSE BLDC GLUCOMTR-MCNC: 194 MG/DL — HIGH (ref 70–99)
GLUCOSE BLDC GLUCOMTR-MCNC: 204 MG/DL — HIGH (ref 70–99)
GLUCOSE BLDC GLUCOMTR-MCNC: 244 MG/DL — HIGH (ref 70–99)
GLUCOSE BLDC GLUCOMTR-MCNC: 264 MG/DL — HIGH (ref 70–99)
GLUCOSE SERPL-MCNC: 194 MG/DL — HIGH (ref 70–99)
HCT VFR BLD CALC: 39.2 % — SIGNIFICANT CHANGE UP (ref 34.5–45)
HGB BLD-MCNC: 12.9 G/DL — SIGNIFICANT CHANGE UP (ref 11.5–15.5)
MCHC RBC-ENTMCNC: 31.2 PG — SIGNIFICANT CHANGE UP (ref 27–34)
MCHC RBC-ENTMCNC: 32.9 GM/DL — SIGNIFICANT CHANGE UP (ref 32–36)
MCV RBC AUTO: 94.7 FL — SIGNIFICANT CHANGE UP (ref 80–100)
METHADONE UR-MCNC: NEGATIVE — SIGNIFICANT CHANGE UP
NRBC # BLD: 0 /100 WBCS — SIGNIFICANT CHANGE UP (ref 0–0)
OPIATES UR-MCNC: NEGATIVE — SIGNIFICANT CHANGE UP
PCP SPEC-MCNC: SIGNIFICANT CHANGE UP
PCP UR-MCNC: NEGATIVE — SIGNIFICANT CHANGE UP
PLATELET # BLD AUTO: 278 K/UL — SIGNIFICANT CHANGE UP (ref 150–400)
POTASSIUM SERPL-MCNC: 3.7 MMOL/L — SIGNIFICANT CHANGE UP (ref 3.5–5.3)
POTASSIUM SERPL-SCNC: 3.7 MMOL/L — SIGNIFICANT CHANGE UP (ref 3.5–5.3)
PROT SERPL-MCNC: 6.6 G/DL — SIGNIFICANT CHANGE UP (ref 6–8.3)
RBC # BLD: 4.14 M/UL — SIGNIFICANT CHANGE UP (ref 3.8–5.2)
RBC # FLD: 13.7 % — SIGNIFICANT CHANGE UP (ref 10.3–14.5)
SODIUM SERPL-SCNC: 136 MMOL/L — SIGNIFICANT CHANGE UP (ref 135–145)
THC UR QL: NEGATIVE — SIGNIFICANT CHANGE UP
WBC # BLD: 7.33 K/UL — SIGNIFICANT CHANGE UP (ref 3.8–10.5)
WBC # FLD AUTO: 7.33 K/UL — SIGNIFICANT CHANGE UP (ref 3.8–10.5)

## 2023-12-12 PROCEDURE — 99223 1ST HOSP IP/OBS HIGH 75: CPT

## 2023-12-12 PROCEDURE — 99222 1ST HOSP IP/OBS MODERATE 55: CPT

## 2023-12-12 PROCEDURE — 99221 1ST HOSP IP/OBS SF/LOW 40: CPT

## 2023-12-12 RX ORDER — KETOROLAC TROMETHAMINE 30 MG/ML
15 SYRINGE (ML) INJECTION EVERY 8 HOURS
Refills: 0 | Status: DISCONTINUED | OUTPATIENT
Start: 2023-12-12 | End: 2023-12-14

## 2023-12-12 RX ORDER — INFLUENZA VIRUS VACCINE 15; 15; 15; 15 UG/.5ML; UG/.5ML; UG/.5ML; UG/.5ML
0.5 SUSPENSION INTRAMUSCULAR ONCE
Refills: 0 | Status: DISCONTINUED | OUTPATIENT
Start: 2023-12-12 | End: 2023-12-14

## 2023-12-12 RX ORDER — LEVETIRACETAM 250 MG/1
250 TABLET, FILM COATED ORAL
Refills: 0 | Status: DISCONTINUED | OUTPATIENT
Start: 2023-12-12 | End: 2023-12-14

## 2023-12-12 RX ORDER — DEXTROSE 50 % IN WATER 50 %
15 SYRINGE (ML) INTRAVENOUS ONCE
Refills: 0 | Status: DISCONTINUED | OUTPATIENT
Start: 2023-12-12 | End: 2023-12-14

## 2023-12-12 RX ORDER — INSULIN LISPRO 100/ML
VIAL (ML) SUBCUTANEOUS
Refills: 0 | Status: DISCONTINUED | OUTPATIENT
Start: 2023-12-12 | End: 2023-12-14

## 2023-12-12 RX ORDER — VERAPAMIL HCL 240 MG
120 CAPSULE, EXTENDED RELEASE PELLETS 24 HR ORAL DAILY
Refills: 0 | Status: DISCONTINUED | OUTPATIENT
Start: 2023-12-12 | End: 2023-12-13

## 2023-12-12 RX ORDER — ENOXAPARIN SODIUM 100 MG/ML
40 INJECTION SUBCUTANEOUS EVERY 24 HOURS
Refills: 0 | Status: DISCONTINUED | OUTPATIENT
Start: 2023-12-12 | End: 2023-12-14

## 2023-12-12 RX ORDER — SODIUM CHLORIDE 9 MG/ML
1000 INJECTION, SOLUTION INTRAVENOUS
Refills: 0 | Status: DISCONTINUED | OUTPATIENT
Start: 2023-12-12 | End: 2023-12-14

## 2023-12-12 RX ORDER — METOCLOPRAMIDE HCL 10 MG
10 TABLET ORAL EVERY 8 HOURS
Refills: 0 | Status: DISCONTINUED | OUTPATIENT
Start: 2023-12-12 | End: 2023-12-14

## 2023-12-12 RX ORDER — INSULIN LISPRO 100/ML
4 VIAL (ML) SUBCUTANEOUS
Refills: 0 | Status: DISCONTINUED | OUTPATIENT
Start: 2023-12-13 | End: 2023-12-13

## 2023-12-12 RX ORDER — DEXTROSE 50 % IN WATER 50 %
12.5 SYRINGE (ML) INTRAVENOUS ONCE
Refills: 0 | Status: DISCONTINUED | OUTPATIENT
Start: 2023-12-12 | End: 2023-12-14

## 2023-12-12 RX ORDER — DEXTROSE 50 % IN WATER 50 %
25 SYRINGE (ML) INTRAVENOUS ONCE
Refills: 0 | Status: DISCONTINUED | OUTPATIENT
Start: 2023-12-12 | End: 2023-12-14

## 2023-12-12 RX ORDER — VALPROIC ACID (AS SODIUM SALT) 250 MG/5ML
500 SOLUTION, ORAL ORAL ONCE
Refills: 0 | Status: COMPLETED | OUTPATIENT
Start: 2023-12-12 | End: 2023-12-12

## 2023-12-12 RX ORDER — SODIUM CHLORIDE 9 MG/ML
1000 INJECTION, SOLUTION INTRAVENOUS
Refills: 0 | Status: DISCONTINUED | OUTPATIENT
Start: 2023-12-12 | End: 2023-12-13

## 2023-12-12 RX ORDER — METOPROLOL TARTRATE 50 MG
25 TABLET ORAL DAILY
Refills: 0 | Status: DISCONTINUED | OUTPATIENT
Start: 2023-12-12 | End: 2023-12-14

## 2023-12-12 RX ORDER — INSULIN LISPRO 100/ML
4 VIAL (ML) SUBCUTANEOUS
Refills: 0 | Status: DISCONTINUED | OUTPATIENT
Start: 2023-12-12 | End: 2023-12-12

## 2023-12-12 RX ORDER — INSULIN LISPRO 100/ML
5 VIAL (ML) SUBCUTANEOUS
Refills: 0 | Status: DISCONTINUED | OUTPATIENT
Start: 2023-12-12 | End: 2023-12-12

## 2023-12-12 RX ORDER — KETOROLAC TROMETHAMINE 30 MG/ML
30 SYRINGE (ML) INJECTION ONCE
Refills: 0 | Status: DISCONTINUED | OUTPATIENT
Start: 2023-12-12 | End: 2023-12-12

## 2023-12-12 RX ORDER — GLUCAGON INJECTION, SOLUTION 0.5 MG/.1ML
1 INJECTION, SOLUTION SUBCUTANEOUS ONCE
Refills: 0 | Status: DISCONTINUED | OUTPATIENT
Start: 2023-12-12 | End: 2023-12-14

## 2023-12-12 RX ORDER — INSULIN GLARGINE 100 [IU]/ML
14 INJECTION, SOLUTION SUBCUTANEOUS AT BEDTIME
Refills: 0 | Status: DISCONTINUED | OUTPATIENT
Start: 2023-12-12 | End: 2023-12-13

## 2023-12-12 RX ORDER — METFORMIN HYDROCHLORIDE 850 MG/1
1000 TABLET ORAL DAILY
Refills: 0 | Status: DISCONTINUED | OUTPATIENT
Start: 2023-12-12 | End: 2023-12-12

## 2023-12-12 RX ADMIN — Medication 10 MILLIGRAM(S): at 14:14

## 2023-12-12 RX ADMIN — Medication 5 UNIT(S): at 17:17

## 2023-12-12 RX ADMIN — Medication 30 MILLIGRAM(S): at 02:45

## 2023-12-12 RX ADMIN — Medication 15 MILLIGRAM(S): at 14:13

## 2023-12-12 RX ADMIN — Medication 4: at 17:17

## 2023-12-12 RX ADMIN — Medication 120 MILLIGRAM(S): at 10:55

## 2023-12-12 RX ADMIN — SODIUM CHLORIDE 70 MILLILITER(S): 9 INJECTION, SOLUTION INTRAVENOUS at 14:25

## 2023-12-12 RX ADMIN — Medication 6: at 22:49

## 2023-12-12 RX ADMIN — INSULIN GLARGINE 14 UNIT(S): 100 INJECTION, SOLUTION SUBCUTANEOUS at 22:36

## 2023-12-12 RX ADMIN — Medication 55 MILLIGRAM(S): at 02:45

## 2023-12-12 RX ADMIN — Medication 15 MILLIGRAM(S): at 22:59

## 2023-12-12 RX ADMIN — LEVETIRACETAM 250 MILLIGRAM(S): 250 TABLET, FILM COATED ORAL at 19:22

## 2023-12-12 RX ADMIN — Medication 30 MILLIGRAM(S): at 03:19

## 2023-12-12 RX ADMIN — Medication 25 MILLIGRAM(S): at 07:57

## 2023-12-12 RX ADMIN — Medication 15 MILLIGRAM(S): at 22:36

## 2023-12-12 RX ADMIN — Medication 15 MILLIGRAM(S): at 15:00

## 2023-12-12 RX ADMIN — LEVETIRACETAM 250 MILLIGRAM(S): 250 TABLET, FILM COATED ORAL at 07:57

## 2023-12-12 NOTE — OCCUPATIONAL THERAPY INITIAL EVALUATION ADULT - ADDITIONAL COMMENTS
Pt is R handed and wears glasses all the time (currently wearing contacts). In addition, pt reports since she was a child she has R hand tremors.

## 2023-12-12 NOTE — PHYSICAL THERAPY INITIAL EVALUATION ADULT - IMPAIRMENTS CONTRIBUTING TO GAIT DEVIATIONS, PT EVAL
impaired balance/abnormal muscle tone/narrow base of support/impaired postural control/decreased strength

## 2023-12-12 NOTE — CONSULT NOTE ADULT - ASSESSMENT
43F with PMH of migraines and DM2 presenting with headaches and R sided hemiplegia, admitted to the general neurology service for further workup.     #Migraines  - plan per primary team    #DM2  - confirm home medications  - sliding scale insulin, finger sticks  - monitor insulin requirement, and add basal bolus if indicated  -HbA1c is >10, would recommend outpatient follow up for medication adjustment  - diabetes educator consult

## 2023-12-12 NOTE — PHYSICAL THERAPY INITIAL EVALUATION ADULT - TRANSFER SAFETY CONCERNS NOTED: SIT/STAND, REHAB EVAL
R ankle with increased tone into ankle inversion and PF/decreased balance during turns/decreased step length/decreased weight-shifting ability

## 2023-12-12 NOTE — CONSULT NOTE ADULT - SUBJECTIVE AND OBJECTIVE BOX
See below for neurology HPI  :43F w/ PMH of asthma, DMII, possible absence seizures (pt not completely sure)and Migraines with R sided hemiplegia who presents for constant pounding, stabbing headache on the right side of her head for two weeks. She notes that within a few hours of her headache onset she developed R sided facial weakness and numbness that progressed to R UE and R LE numbness and weakness. The migraine is associated with nausea, photophobia and phonophobia. She has had migraines like this before in the past. The last time it was this bad was in october this year she was in [place] at the time and admitted to [facility] and had a brain MRI done and EEG and results were all normal. She is taking toradol injections at home along with keppra 250 mg, metoprolol, for her headaches and it has not helped her. She notes that she previously tried topiramate for her headaches but experiences tingling sensations so her doctor stopped the medication. She also notes that her doctor told her that she cannot be on any triptan medications due to the hemiplegia she experiences with her migraines. Code stroke was called in the ED and head CT and CTA H&N were negative. MRI short stroke protocol with DWI ws done and did not show evidence of acute ischemic infarct. Migraine was still not improved at all since receiving, tylenol, toradol depakote, reglan and IVF in the ED so patient was admitted. .   She denies any fevers, chills, chest pain, SOB, dysuria, or diarrhea.     FAMILY HISTORY: maternal grandmother stroke and migraines, no hx of seizures in family.   SOCIAL HISTORY: negative for tobacco, alcohol, or ilicit drug use.  SURGICAL HISTORY: appendectomy and cholecystectomy    Allergies  No Known Allergies      NEURO:   MENTAL STATUS: AAOx3  LANG/SPEECH: impaired fluency speech with paucities, intact naming, repetition & comprehension  CRANIAL NERVES:  II: Pupils equal and reactive, no RAPD, normal visual field  III, IV, VI: EOM intact, no gaze preference or deviation  V: normal  VII: R upper and lower facial weakness and decreased sensation  VIII: normal hearing to speech  MOTOR: 5/5 in l UE and L LE, R UE 4-/5, R LE 3/5  REFLEXES: 2/4 throughout, bilateral flexor plantars  SENSORY: loss of sensation on R Ue and R LE to light touch, vibration and pin prick,   COORD: Normal finger to nose and heel to shin, no tremor, no dysmetria on R UE and R LE, L Ue no dysmetria but limited by weakness. R LE unable to perform HTS due to weakness.  Gait: deferred.       LABS:                        14.8   8.20  )-----------( 260      ( 11 Dec 2023 17:07 )             43.5     12-11    132<L>  |  102  |  7   ----------------------------<  305<H>  4.3   |  19<L>  |  0.43<L>    Ca    8.6      11 Dec 2023 21:59    TPro  7.0  /  Alb  4.0  /  TBili  0.2  /  DBili  x   /  AST  16  /  ALT  22  /  AlkPhos  165<H>  12-11    Hemoglobin A1C:   Vitamin B12   PT/INR - ( 11 Dec 2023 20:07 )   PT: 9.8 sec;   INR: 0.86          PTT - ( 11 Dec 2023 20:07 )  PTT:27.5 sec  CAPILLARY BLOOD GLUCOSE  325 (11 Dec 2023 18:29)      POCT Blood Glucose.: 325 mg/dL (11 Dec 2023 17:04)      Urinalysis Basic - ( 11 Dec 2023 21:59 )    Color: x / Appearance: x / SG: x / pH: x  Gluc: 305 mg/dL / Ketone: x  / Bili: x / Urobili: x   Blood: x / Protein: x / Nitrite: x   Leuk Esterase: x / RBC: x / WBC x   Sq Epi: x / Non Sq Epi: x / Bacteria: x            Microbiology:      RADIOLOGY, EKG AND ADDITIONAL TESTS:   CT Head: negative  CTA H&N: negative  MRI DWI: so sign of acute ischemic infarct    Allergies and Intolerances:        Allergies:  	No Known Allergies:     Home Medications:   * Outpatient Medication Status not yet specified    .    Patient History:   Social History:  · Substance use	No    Tobacco Screening:  · Core Measure Site	No    Risk Assessment:   Present on Admission:  Deep Venous Thrombosis	no  Pulmonary Embolus	no    HIV Screening:  · In accordance with [place] State law, we offer every patient who comes to our ED an HIV test. Would you like to be tested today?	Opt out"    Vital Signs Last 24 Hrs  T(C): 36.7 (12 Dec 2023 02:48), Max: 37 (11 Dec 2023 19:17)  T(F): 98 (12 Dec 2023 02:48), Max: 98.6 (11 Dec 2023 19:17)  HR: 76 (12 Dec 2023 02:48) (75 - 82)  BP: 122/76 (12 Dec 2023 02:48) (113/59 - 139/82)  BP(mean): --  RR: 16 (12 Dec 2023 02:48) (16 - 18)  SpO2: 99% (12 Dec 2023 02:48) (97% - 99%)    Parameters below as of 12 Dec 2023 02:48  Patient On (Oxygen Delivery Method): room air    Physical exam  General  HEENT  Cards  Pulm  Ab  Neuro  Ext  Derm  Psych       See below for neurology HPI  :43F w/ PMH of asthma, DMII, possible absence seizures (pt not completely sure)and Migraines with R sided hemiplegia who presents for constant pounding, stabbing headache on the right side of her head for two weeks. She notes that within a few hours of her headache onset she developed R sided facial weakness and numbness that progressed to R UE and R LE numbness and weakness. The migraine is associated with nausea, photophobia and phonophobia. She has had migraines like this before in the past. The last time it was this bad was in october this year she was in [place] at the time and admitted to [facility] and had a brain MRI done and EEG and results were all normal. She is taking toradol injections at home along with keppra 250 mg, metoprolol, for her headaches and it has not helped her. She notes that she previously tried topiramate for her headaches but experiences tingling sensations so her doctor stopped the medication. She also notes that her doctor told her that she cannot be on any triptan medications due to the hemiplegia she experiences with her migraines. Code stroke was called in the ED and head CT and CTA H&N were negative. MRI short stroke protocol with DWI ws done and did not show evidence of acute ischemic infarct. Migraine was still not improved at all since receiving, tylenol, toradol depakote, reglan and IVF in the ED so patient was admitted. .   She denies any fevers, chills, chest pain, SOB, dysuria, or diarrhea.     FAMILY HISTORY: maternal grandmother stroke and migraines, no hx of seizures in family.   SOCIAL HISTORY: negative for tobacco, alcohol, or ilicit drug use.  SURGICAL HISTORY: appendectomy and cholecystectomy    Allergies  No Known Allergies      NEURO:   MENTAL STATUS: AAOx3  LANG/SPEECH: impaired fluency speech with paucities, intact naming, repetition & comprehension  CRANIAL NERVES:  II: Pupils equal and reactive, no RAPD, normal visual field  III, IV, VI: EOM intact, no gaze preference or deviation  V: normal  VII: R upper and lower facial weakness and decreased sensation  VIII: normal hearing to speech  MOTOR: 5/5 in l UE and L LE, R UE 4-/5, R LE 3/5  REFLEXES: 2/4 throughout, bilateral flexor plantars  SENSORY: loss of sensation on R Ue and R LE to light touch, vibration and pin prick,   COORD: Normal finger to nose and heel to shin, no tremor, no dysmetria on R UE and R LE, L Ue no dysmetria but limited by weakness. R LE unable to perform HTS due to weakness.  Gait: deferred.       LABS:                        14.8   8.20  )-----------( 260      ( 11 Dec 2023 17:07 )             43.5     12-11    132<L>  |  102  |  7   ----------------------------<  305<H>  4.3   |  19<L>  |  0.43<L>    Ca    8.6      11 Dec 2023 21:59    TPro  7.0  /  Alb  4.0  /  TBili  0.2  /  DBili  x   /  AST  16  /  ALT  22  /  AlkPhos  165<H>  12-11    Hemoglobin A1C:   Vitamin B12   PT/INR - ( 11 Dec 2023 20:07 )   PT: 9.8 sec;   INR: 0.86          PTT - ( 11 Dec 2023 20:07 )  PTT:27.5 sec  CAPILLARY BLOOD GLUCOSE  325 (11 Dec 2023 18:29)      POCT Blood Glucose.: 325 mg/dL (11 Dec 2023 17:04)      Urinalysis Basic - ( 11 Dec 2023 21:59 )    Color: x / Appearance: x / SG: x / pH: x  Gluc: 305 mg/dL / Ketone: x  / Bili: x / Urobili: x   Blood: x / Protein: x / Nitrite: x   Leuk Esterase: x / RBC: x / WBC x   Sq Epi: x / Non Sq Epi: x / Bacteria: x            Microbiology:      RADIOLOGY, EKG AND ADDITIONAL TESTS:   CT Head: negative  CTA H&N: negative  MRI DWI: so sign of acute ischemic infarct    Allergies and Intolerances:        Allergies:  	No Known Allergies:     Home Medications:   * Outpatient Medication Status not yet specified    .    Patient History:   Social History:  · Substance use	No    Tobacco Screening:  · Core Measure Site	No    Risk Assessment:   Present on Admission:  Deep Venous Thrombosis	no  Pulmonary Embolus	no    HIV Screening:  · In accordance with [place] State law, we offer every patient who comes to our ED an HIV test. Would you like to be tested today?	Opt out"    Vital Signs Last 24 Hrs  T(C): 36.7 (12 Dec 2023 02:48), Max: 37 (11 Dec 2023 19:17)  T(F): 98 (12 Dec 2023 02:48), Max: 98.6 (11 Dec 2023 19:17)  HR: 76 (12 Dec 2023 02:48) (75 - 82)  BP: 122/76 (12 Dec 2023 02:48) (113/59 - 139/82)  BP(mean): --  RR: 16 (12 Dec 2023 02:48) (16 - 18)  SpO2: 99% (12 Dec 2023 02:48) (97% - 99%)    Parameters below as of 12 Dec 2023 02:48  Patient On (Oxygen Delivery Method): room air    Physical exam  General  HEENT  Cards  Pulm  Ab  Neuro  Ext  Derm  Psych

## 2023-12-12 NOTE — OCCUPATIONAL THERAPY INITIAL EVALUATION ADULT - MODALITIES TREATMENT COMMENTS
Cranial Nerves II - XII: II: PERRLA; visual fields are full to confrontation III, IV, VI: EOMI, no nystagmus appreciated V: facial sensation IMPAIRED to light touch ON RIGHT SIDE V1-V3 b/l VII: no ptosis, RIGHT FACIAL DROP, DIFFICULT WITH eyebrow raise and closure  b/l XII: DECREASE tongue protrusion

## 2023-12-12 NOTE — PATIENT PROFILE ADULT - FALL HARM RISK - HARM RISK INTERVENTIONS
Assistance OOB with selected safe patient handling equipment/Communicate Risk of Fall with Harm to all staff/Discuss with provider need for PT consult/Monitor gait and stability/Provide patient with walking aids - walker, cane, crutches/Reinforce activity limits and safety measures with patient and family/Tailored Fall Risk Interventions/Visual Cue: Yellow wristband and red socks/Bed in lowest position, wheels locked, appropriate side rails in place/Call bell, personal items and telephone in reach/Instruct patient to call for assistance before getting out of bed or chair/Non-slip footwear when patient is out of bed/[place] to call system/Physically safe environment - no spills, clutter or unnecessary equipment/Purposeful Proactive Rounding/Room/bathroom lighting operational, light cord in reach Assistance OOB with selected safe patient handling equipment/Communicate Risk of Fall with Harm to all staff/Discuss with provider need for PT consult/Monitor gait and stability/Provide patient with walking aids - walker, cane, crutches/Reinforce activity limits and safety measures with patient and family/Tailored Fall Risk Interventions/Visual Cue: Yellow wristband and red socks/Bed in lowest position, wheels locked, appropriate side rails in place/Call bell, personal items and telephone in reach/Instruct patient to call for assistance before getting out of bed or chair/Non-slip footwear when patient is out of bed/[place] to call system/Physically safe environment - no spills, clutter or unnecessary equipment/Purposeful Proactive Rounding/Room/bathroom lighting operational, light cord in reach

## 2023-12-12 NOTE — ED ADULT NURSE REASSESSMENT NOTE - NS ED NURSE REASSESS COMMENT FT1
MD Marquis as of 220, verbally dc'ed qhr neuro checks and NIhs
MD Marquis verbalized OK / safe for pt. to go MRI off monitor.
Pt finished CT and CTA. Pt brought back from radiology with RN and SSA to bed 1. Pt was told we would change her into appropriate fall risk gown and placed on CCM for stroke protocol. Pt refused to allow RN to assist pt with CCM and gown. Pt stated "I want my stuff, you cant do anything to me until I have my stuff!!" Pt was told security was grabbing her belonging and educated on the importance of the CCM. Pt stated "Youre not touching me, I don't like your attitude and I want a new nurse now". NICK Ta made aware of the situation
Received report from RICKIE Garza. Received patient in stretcher. AOX4. Vital signs as noted in flowsheet.  Patient oriented to ED area. Plan of care discussed and verbalized understanding. All needs attended. Fall risk precautions maintained. Purposeful proactive hourly rounding in progress.

## 2023-12-12 NOTE — OCCUPATIONAL THERAPY INITIAL EVALUATION ADULT - LIVES WITH, PROFILE
Pt lives in [place] (visiting her grandmother in [place] who lives in a 3 floor walk-up). At home, pt has 1 step to enter and lives with her adult son. Pt reports at baseline she is ind for ADLs and is supposed to use RW to ambulate however does not. She has frequent migrans causing decrease R side sensation, R ankle inverted and exacerbation of deficits./children

## 2023-12-12 NOTE — PHYSICAL THERAPY INITIAL EVALUATION ADULT - SENSORY TESTS
R hand dominant; (L) hand  5/5, (R) hand  3/5. CN Testing: B/L Frontalis impaired; B/L buccinator impaired; smile R impaired; tongue protrusion at midline; B/L eyes open/close intact; Shoulder elevation: N/T; Vision H-Test: bilateral tracking and smooth pursuit impaired L to R; Convergence/Divergence: intact; Vision Quadrant Test: intact bilaterally.

## 2023-12-12 NOTE — PHYSICAL THERAPY INITIAL EVALUATION ADULT - MANUAL MUSCLE TESTING RESULTS, REHAB EVAL
MMT performed: (L)UE and (L)LE 5/5 for all major pivots; (R)shoulder flexion 2-/5, (R)elbow flexion 3/5, (R)elbow extension 3/5, (R)wrist extension N/T, (R)wrist flexion N/T; (R)hip flexion 2-/5, (R)knee extension 2-/5, (R)knee flexion N/T, (R)ankle DF N/T, (R)ankle PF N/T

## 2023-12-12 NOTE — CONSULT NOTE ADULT - PROBLEM SELECTOR RECOMMENDATION 9
Type 2 diabetes mellitus with hyperglycemia  - Please continue lantus *** units at bedtime.   - Continue lispro *** units before each meal.  - Continue lispro moderate / low dose sliding scale before meals and at bedtime.  - Patient's fingerstick glucose goal is 100-180 mg/dL.    - For discharge, patient can ***.    - Patient can follow up at discharge with [facility] by calling [phone] to make an appointment.      Case discussed with Dr. Campos. Primary team updated. Type 2 diabetes mellitus with hyperglycemia  - Please continue lantus *** units at bedtime.   - Continue lispro *** units before each meal.  - Continue lispro moderate / low dose sliding scale before meals and at bedtime.  - Patient's fingerstick glucose goal is 100-180 mg/dL.    - For discharge, patient can ***.    - Patient can follow up at discharge with [facility] by calling [phone] to make an appointment.      Case discussed with Dr. Campos. Primary team updated. Type 2 diabetes mellitus with hyperglycemia  - Please start lantus 14 units at bedtime.   - Decrease lispro to 4 units before each meal.  - Continue lispro moderate dose sliding scale before meals and at bedtime.  - Patient's fingerstick glucose goal is 100-180 mg/dL.    - For discharge, patient can ***.    - Patient can follow up at discharge with [facility] by calling [phone] to make an appointment.      Case discussed with Dr. Campos. Primary team updated. Type 2 diabetes mellitus with hyperglycemia  - Please start lantus 14 units at bedtime.   - Decrease lispro to 4 units before each meal.  - Continue lispro moderate dose sliding scale before meals and at bedtime.  - Patient's fingerstick glucose goal is 100-180 mg/dL.    - For discharge, patient can ***.    - Patient can follow up at discharge with [facility] by calling [phone] to make an appointment.      Case discussed with Dr. Campos. Primary team updated.

## 2023-12-12 NOTE — PHYSICAL THERAPY INITIAL EVALUATION ADULT - GENERAL OBSERVATIONS, REHAB EVAL
PT IE Completed. Chart reviewed. MRS 4. Pt received semi-supine, NAD, +IV heplock. RICKIE Mane cleared pt for PT.

## 2023-12-12 NOTE — PHYSICAL THERAPY INITIAL EVALUATION ADULT - ADDITIONAL COMMENTS
Pt reports living in apartment with son in [place]. Reports visiting grandmother in [place], and is staying in her apartment with 3 FOS to enter. Reports being independent with daily activities, and that son usually helps around the house.

## 2023-12-12 NOTE — H&P ADULT - ASSESSMENT
43F w/ PMH of asthma, DMII, possible absence seizures (pt not completely sure)and Migraines with R sided hemiplegia who presents for migraine headache associated with R sided hemiplegia. CT head and CTA H&N were negative. Stroke team evaluated patient and MRI DWI was also negative for acute infarct. Patient's symptoms are likely due to Migraine with hemiplegia,    Plan    #Status Migrainosis w/ r sided hemiplegia  - additional toradol 30 mg IVP  - additional valproic acid  mg  - monitor for symptom improvement  - neuro checks q8  - reglan PRN for nausea  - resume home keppra 250 mg BID  - resume home metorpolol 25 mg ER qd    #DMII, chronic  - ISS  - glucose accuchecks    Diet: Consistent carb  DVT ppx: lovenox    Case discussed with Dr. Troncoso     43F w/ PMH of asthma, DMII, possible absence seizures (pt not completely sure)and Migraines with R sided hemiplegia who presents for migraine headache associated with R sided hemiplegia. CT head and CTA H&N were negative. Stroke team evaluated patient and MRI DWI was also negative for acute infarct. Patient's symptoms are likely due to Migraine with hemiplegia,    Plan    #Status Migrainosis w/ r sided hemiplegia  - additional toradol 30 mg IVP  - additional valproic acid  mg  - monitor for symptom improvement  - neuro checks q8  - reglan PRN for nausea  - resume home keppra 250 mg BID  - resume home metorpolol 25 mg ER qd  - PT/OT for r sided weakness    #DMII, chronic  - ISS  - glucose accuchecks    Diet: Consistent carb  DVT ppx: lovenox    Case discussed with Dr. Troncoso     43F w/ PMH of asthma, DMII, possible absence seizures (pt not completely sure)and Migraines with R sided hemiplegia who presents for migraine headache associated with R sided hemiplegia. CT head and CTA H&N were negative. Stroke team evaluated patient and MRI DWI was also negative for acute infarct. Patient's symptoms are likely due to Migraine with hemiplegia,    Plan    #Status Migrainosis w/ r sided hemiplegia  - additional toradol 30 mg IVP  - additional valproic acid  mg  - monitor for symptom improvement  - neuro checks q8  - reglan PRN for nausea  - resume home keppra 250 mg BID  - resume home metorpolol 25 mg ER qd  - PT/OT for r sided weakness  -Will start verapamil 120 mg daily then increase to twice a day as needed given inadequate response to Toradol, valproate, tylenol, reglan, hydration.    #DMII, chronic  - ISS  - glucose accuchecks    Diet: Consistent carb  DVT ppx: lovenox    Case discussed with Dr. Troncoso

## 2023-12-12 NOTE — PHYSICAL THERAPY INITIAL EVALUATION ADULT - GAIT DEVIATIONS NOTED, PT EVAL
Pt with R ankle tone into inverted and PF position; pt compensating with R hip internal rotation and increased R hip flexion during ambulation to clear obstacles and place R foot/decreased yola/decreased step length/decreased weight-shifting ability

## 2023-12-12 NOTE — OCCUPATIONAL THERAPY INITIAL EVALUATION ADULT - RANGE OF MOTION EXAMINATION, UPPER EXTREMITY
R UE PROM WFL with compensation noted with trunk rotation/Left UE Active ROM was WFL (within functional limits)

## 2023-12-12 NOTE — OCCUPATIONAL THERAPY INITIAL EVALUATION ADULT - DIAGNOSIS, OT EVAL
Pt p/w diminished R facial and UE/LE sensation, asymmetrical smile, ROM, motor planning, coordination and strength affecting ADls and functional mobility

## 2023-12-12 NOTE — CONSULT NOTE ADULT - SUBJECTIVE AND OBJECTIVE BOX
HISTORY OF PRESENT ILLNESS  43F w/ PMH of asthma, DMII, possible absence seizures (pt not completely sure)and Migraines with R sided hemiplegia who presents for constant pounding, stabbing headache on the right side of her head for two weeks. She notes that within a few hours of her headache onset she developed R sided facial weakness and numbness that progressed to R UE and R LE numbness and weakness. The migraine is associated with nausea, photophobia and phonophobia. She has had migraines like this before in the past. The last time it was this bad was in october this year she was in [place] at the time and admitted to [facility] and had a brain MRI done and EEG and results were all normal. She is taking toradol injections at home along with keppra 250 mg, metoprolol, for her headaches and it has not helped her. She notes that she previously tried topiramate for her headaches but experiences tingling sensations so her doctor stopped the medication. She also notes that her doctor told her that she cannot be on any triptan medications due to the hemiplegia she experiences with her migraines. Code stroke was called in the ED and head CT and CTA H&N were negative. MRI short stroke protocol with DWI ws done and did not show evidence of acute ischemic infarct. Migraine was still not improved at all since receiving, tylenol, toradol depakote, reglan and IVF in the ED so patient was admitted. .   She denies any fevers, chills, chest pain, SOB, dysuria, or diarrhea.     FAMILY HISTORY: maternal grandmother stroke and migraines, no hx of seizures in family.   SOCIAL HISTORY: negative for tobacco, alcohol, or ilicit drug use.  SURGICAL HISTORY: appendectomy and cholecystectomy      CAPILLARY BLOOD GLUCOSE & INSULIN RECEIVED  325 mg/dL (12-11 @ 17:04)  378 mg/dL (12-11 @ 17:07)  305 mg/dL (12-11 @ 21:59)  225 mg/dL (12-11 @ 23:09)  216 mg/dL (12-11 @ 23:42)  194 mg/dL (12-12 @ 05:30)  194 mg/dL (12-12 @ 07:21)    Medrol pack 11/13  DIABETES HISTORY  - Age at diagnosis:   - Symptoms at time of diagnosis:   - Current Therapy:  - History of other regimens: glimeperide 2mg, metformin ER 1000mg, mounjaro 5mg,   - History of hypoglycemia:   - History of DKA/HHS:   - Complications:   - Home FSG: scarlet 2        > Fasting: *** mg/dL.        > Before meals: *** mg/dL.        > Bedtime: *** mg/dL.  - Diet:          > Breakfast:         > Lunch:        > Dinner:        > Snacks:  - Physical activity:    - Outpatient follow-up:     PAST MEDICAL & SURGICAL HISTORY  As per history of present illness.     FAMILY HISTORY  - Diabetes:  - Thyroid:  - Autoimmune:  - Other:    SOCIAL HISTORY  - Work:  - Alcohol:  - Smoking:  - Recreational Drugs:    ALLERGIES  No Known Allergies    CURRENT MEDICATIONS  dextrose 5%. 1000 milliLiter(s) IV Continuous <Continuous>  dextrose 5%. 1000 milliLiter(s) IV Continuous <Continuous>  dextrose 50% Injectable 25 Gram(s) IV Push once  dextrose 50% Injectable 12.5 Gram(s) IV Push once  dextrose 50% Injectable 25 Gram(s) IV Push once  dextrose Oral Gel 15 Gram(s) Oral once PRN  glucagon  Injectable 1 milliGRAM(s) IntraMuscular once  insulin lispro (ADMELOG) corrective regimen sliding scale   SubCutaneous Before meals and at bedtime  insulin lispro Injectable (ADMELOG) 5 Unit(s) SubCutaneous three times a day before meals  ketorolac   Injectable 15 milliGRAM(s) IV Push every 8 hours  levETIRAcetam 250 milliGRAM(s) Oral two times a day  metoclopramide Injectable 10 milliGRAM(s) IV Push every 8 hours PRN  metoprolol succinate ER 25 milliGRAM(s) Oral daily  verapamil  milliGRAM(s) Oral daily    REVIEW OF SYSTEMS  Constitutional:  Negative fever, chills or loss of appetite.  Eyes:  Negative blurry vision or double vision.  Cardiovascular:  Negative for chest pain or palpitations.  Respiratory:  Negative for cough, wheezing, or shortness of breath.   Gastrointestinal:  Negative for nausea, vomiting, diarrhea, constipation, or abdominal pain.  Genitourinary:  Negative frequency, urgency or dysuria.  Neurologic:  No headache, confusion, dizziness, lightheadedness.    PHYSICAL EXAM  Vital Signs Last 24 Hrs  T(C): 36.7 (12 Dec 2023 08:57), Max: 37 (11 Dec 2023 19:17)  T(F): 98 (12 Dec 2023 08:57), Max: 98.6 (11 Dec 2023 19:17)  HR: 82 (12 Dec 2023 08:57) (75 - 82)  BP: 120/76 (12 Dec 2023 08:57) (113/59 - 139/82)  BP(mean): --  RR: 18 (12 Dec 2023 08:57) (16 - 18)  SpO2: 100% (12 Dec 2023 08:57) (97% - 100%)    Parameters below as of 12 Dec 2023 08:57  Patient On (Oxygen Delivery Method): room air    Constitutional: Awake, alert, in no acute distress.   HEENT: Normocephalic, atraumatic, ANUSHA, no proptosis or lid retraction.   Neck: supple, no acanthosis, no thyromegaly or palpable thyroid nodules.  Respiratory: Lungs clear to ausculation bilaterally.   Cardiovascular: regular rhythm, normal S1 and S2, no audible murmurs.   GI: soft, non-tender, non-distended, bowel sounds present, no masses appreciated.  Extremities: No lower extremity edema, peripheral pulses present.   Skin: no rashes.   Neuro: impaired fluency speech with paucities, intact naming, repetition & comprehension,  R upper and lower facial weakness and decreased sensation, 5/5 in l UE and L LE, R UE 4-/5, R LE 3/5, 2/4 throughout, bilateral flexor plantars  Psychiatric: AAO x 3. Normal affect/mood.     LABS  CBC - WBC/HGB/HTC/PLT: 7.33/12.9/39.2/278 (12-12-23)  BMP: Na/K/Cl/Bicarb/BUN/Cr/Gluc: 136/3.7/108/20/5/0.44/194 (12-12-23)  Anion Gap: 8 (12-12-23)  eGFR: 125 (12-12-23)  Calcium: 8.2 (12-12-23)  Phosphorus: -- (12-12-23)  Magnesium: -- (12-12-23)  LFT - Alb/Tprot/Tbili/Dbili/AlkPhos/ALT/AST: 3.8/--/0.2/--/142/20/12 (12-12-23)  PT/aPTT/INR: 9.8/27.5/0.86 (12-11-23)     HISTORY OF PRESENT ILLNESS  43F w/ PMH of asthma, DMII, possible absence seizures (pt not completely sure)and Migraines with R sided hemiplegia who presents for constant pounding, stabbing headache on the right side of her head for two weeks. She notes that within a few hours of her headache onset she developed R sided facial weakness and numbness that progressed to R UE and R LE numbness and weakness. The migraine is associated with nausea, photophobia and phonophobia. She has had migraines like this before in the past. The last time it was this bad was in october this year she was in [place] at the time and admitted to [facility] and had a brain MRI done and EEG and results were all normal. She is taking toradol injections at home along with keppra 250 mg, metoprolol, for her headaches and it has not helped her. She notes that she previously tried topiramate for her headaches but experiences tingling sensations so her doctor stopped the medication. She also notes that her doctor told her that she cannot be on any triptan medications due to the hemiplegia she experiences with her migraines. Code stroke was called in the ED and head CT and CTA H&N were negative. MRI short stroke protocol with DWI ws done and did not show evidence of acute ischemic infarct. Migraine was still not improved at all since receiving, tylenol, toradol depakote, reglan and IVF in the ED so patient was admitted. .   She denies any fevers, chills, chest pain, SOB, dysuria, or diarrhea.     FAMILY HISTORY: maternal grandmother stroke and migraines, no hx of seizures in family.   SOCIAL HISTORY: negative for tobacco, alcohol, or ilicit drug use.  SURGICAL HISTORY: appendectomy and cholecystectomy      CAPILLARY BLOOD GLUCOSE & INSULIN RECEIVED  325 mg/dL (12-11 @ 17:04)  378 mg/dL (12-11 @ 17:07)  305 mg/dL (12-11 @ 21:59)  225 mg/dL (12-11 @ 23:09)  216 mg/dL (12-11 @ 23:42)  194 mg/dL (12-12 @ 05:30)  194 mg/dL (12-12 @ 07:21)    Medrol pack 11/13  DIABETES HISTORY  - Age at diagnosis:   - Symptoms at time of diagnosis:   - Current Therapy:  - History of other regimens: glimeperide 2mg, metformin ER 1000mg, mounjaro 5mg,   - History of hypoglycemia:   - History of DKA/HHS:   - Complications:   - Home FSG: scarlet 2        > Fasting: *** mg/dL.        > Before meals: *** mg/dL.        > Bedtime: *** mg/dL.  - Diet:          > Breakfast:         > Lunch:        > Dinner:        > Snacks:  - Physical activity:    - Outpatient follow-up:     PAST MEDICAL & SURGICAL HISTORY  As per history of present illness.     FAMILY HISTORY  - Diabetes:  - Thyroid:  - Autoimmune:  - Other:    SOCIAL HISTORY  - Work:  - Alcohol:  - Smoking:  - Recreational Drugs:    ALLERGIES  No Known Allergies    CURRENT MEDICATIONS  dextrose 5%. 1000 milliLiter(s) IV Continuous <Continuous>  dextrose 5%. 1000 milliLiter(s) IV Continuous <Continuous>  dextrose 50% Injectable 25 Gram(s) IV Push once  dextrose 50% Injectable 12.5 Gram(s) IV Push once  dextrose 50% Injectable 25 Gram(s) IV Push once  dextrose Oral Gel 15 Gram(s) Oral once PRN  glucagon  Injectable 1 milliGRAM(s) IntraMuscular once  insulin lispro (ADMELOG) corrective regimen sliding scale   SubCutaneous Before meals and at bedtime  insulin lispro Injectable (ADMELOG) 5 Unit(s) SubCutaneous three times a day before meals  ketorolac   Injectable 15 milliGRAM(s) IV Push every 8 hours  levETIRAcetam 250 milliGRAM(s) Oral two times a day  metoclopramide Injectable 10 milliGRAM(s) IV Push every 8 hours PRN  metoprolol succinate ER 25 milliGRAM(s) Oral daily  verapamil  milliGRAM(s) Oral daily    REVIEW OF SYSTEMS  Constitutional:  Negative fever, chills or loss of appetite.  Eyes:  Negative blurry vision or double vision.  Cardiovascular:  Negative for chest pain or palpitations.  Respiratory:  Negative for cough, wheezing, or shortness of breath.   Gastrointestinal:  Negative for nausea, vomiting, diarrhea, constipation, or abdominal pain.  Genitourinary:  Negative frequency, urgency or dysuria.  Neurologic:  No headache, confusion, dizziness, lightheadedness.    PHYSICAL EXAM  Vital Signs Last 24 Hrs  T(C): 36.7 (12 Dec 2023 08:57), Max: 37 (11 Dec 2023 19:17)  T(F): 98 (12 Dec 2023 08:57), Max: 98.6 (11 Dec 2023 19:17)  HR: 82 (12 Dec 2023 08:57) (75 - 82)  BP: 120/76 (12 Dec 2023 08:57) (113/59 - 139/82)  BP(mean): --  RR: 18 (12 Dec 2023 08:57) (16 - 18)  SpO2: 100% (12 Dec 2023 08:57) (97% - 100%)    Parameters below as of 12 Dec 2023 08:57  Patient On (Oxygen Delivery Method): room air    Constitutional: Awake, alert, in no acute distress.   HEENT: Normocephalic, atraumatic, ANUSHA, no proptosis or lid retraction.   Neck: supple, no acanthosis, no thyromegaly or palpable thyroid nodules.  Respiratory: Lungs clear to ausculation bilaterally.   Cardiovascular: regular rhythm, normal S1 and S2, no audible murmurs.   GI: soft, non-tender, non-distended, bowel sounds present, no masses appreciated.  Extremities: No lower extremity edema, peripheral pulses present.   Skin: no rashes.   Neuro: impaired fluency speech with paucities, intact naming, repetition & comprehension,  R upper and lower facial weakness and decreased sensation, 5/5 in l UE and L LE, R UE 4-/5, R LE 3/5, 2/4 throughout, bilateral flexor plantars  Psychiatric: AAO x 3. Normal affect/mood.     LABS  CBC - WBC/HGB/HTC/PLT: 7.33/12.9/39.2/278 (12-12-23)  BMP: Na/K/Cl/Bicarb/BUN/Cr/Gluc: 136/3.7/108/20/5/0.44/194 (12-12-23)  Anion Gap: 8 (12-12-23)  eGFR: 125 (12-12-23)  Calcium: 8.2 (12-12-23)  Phosphorus: -- (12-12-23)  Magnesium: -- (12-12-23)  LFT - Alb/Tprot/Tbili/Dbili/AlkPhos/ALT/AST: 3.8/--/0.2/--/142/20/12 (12-12-23)  PT/aPTT/INR: 9.8/27.5/0.86 (12-11-23)     HISTORY OF PRESENT ILLNESS  43F w/ PMH of asthma, DMII, possible absence seizures (pt not completely sure)and Migraines with R sided hemiplegia who presents for constant pounding, stabbing headache on the right side of her head for two weeks. She notes that within a few hours of her headache onset she developed R sided facial weakness and numbness that progressed to R UE and R LE numbness and weakness. The migraine is associated with nausea, photophobia and phonophobia. She has had migraines like this before in the past. The last time it was this bad was in october this year she was in [place] at the time and admitted to [facility] and had a brain MRI done and EEG and results were all normal. She is taking toradol injections at home along with keppra 250 mg, metoprolol, for her headaches and it has not helped her. She notes that she previously tried topiramate for her headaches but experiences tingling sensations so her doctor stopped the medication. She also notes that her doctor told her that she cannot be on any triptan medications due to the hemiplegia she experiences with her migraines. Code stroke was called in the ED and head CT and CTA H&N were negative. MRI short stroke protocol with DWI ws done and did not show evidence of acute ischemic infarct. Migraine was still not improved at all since receiving, tylenol, toradol depakote, reglan and IVF in the ED so patient was admitted.     FAMILY HISTORY: maternal grandmother stroke and migraines, no hx of seizures in family.   SOCIAL HISTORY: negative for tobacco, alcohol, or ilicit drug use.  SURGICAL HISTORY: appendectomy and cholecystectomy    Endocrine consulted for uncontrolled type 2 diabetes. Pt seen in ED. Reports headache is worse and has nausea with one episode of vomiting. Is trying to eat though. History is limited due to current symptoms. Pt reports A1C was 18% in October and started on metformin and mounjaro 5mg. Last mounjaro dose approx 2 weeks. Recently on 2 month course of various steroids for asthma c/b RSV infection. Prednisone 20 mg self tapered over about a week, last dose 3-4 days ago. Has not been checking glucose on steroids but previously using scarlet. Diet seems appropriate. Has neruologist in [place] and [place], but will be spending the next month in [facility] and then in [place].    CAPILLARY BLOOD GLUCOSE & INSULIN RECEIVED  325 mg/dL (12-11 @ 17:04)  378 mg/dL (12-11 @ 17:07)  305 mg/dL (12-11 @ 21:59)  225 mg/dL (12-11 @ 23:09) - Lispro 4  216 mg/dL (12-11 @ 23:42)  194 mg/dL (12-12 @ 05:30)  194 mg/dL (12-12 @ 07:21)      PAST MEDICAL & SURGICAL HISTORY  As per history of present illness.     FAMILY HISTORY  - Diabetes: yes  - Thyroid: denies  - Autoimmune: denies    ALLERGIES  No Known Allergies    CURRENT MEDICATIONS  dextrose 5%. 1000 milliLiter(s) IV Continuous <Continuous>  dextrose 5%. 1000 milliLiter(s) IV Continuous <Continuous>  dextrose 50% Injectable 25 Gram(s) IV Push once  dextrose 50% Injectable 12.5 Gram(s) IV Push once  dextrose 50% Injectable 25 Gram(s) IV Push once  dextrose Oral Gel 15 Gram(s) Oral once PRN  glucagon  Injectable 1 milliGRAM(s) IntraMuscular once  insulin lispro (ADMELOG) corrective regimen sliding scale   SubCutaneous Before meals and at bedtime  insulin lispro Injectable (ADMELOG) 5 Unit(s) SubCutaneous three times a day before meals  ketorolac   Injectable 15 milliGRAM(s) IV Push every 8 hours  levETIRAcetam 250 milliGRAM(s) Oral two times a day  metoclopramide Injectable 10 milliGRAM(s) IV Push every 8 hours PRN  metoprolol succinate ER 25 milliGRAM(s) Oral daily  verapamil  milliGRAM(s) Oral daily    REVIEW OF SYSTEMS  Constitutional:  Negative fever, chills or loss of appetite.  Eyes:  Negative blurry vision or double vision.  Cardiovascular:  Negative for chest pain or palpitations.  Respiratory:  Negative for cough, wheezing, or shortness of breath.   Gastrointestinal:  Negative for nausea, vomiting, diarrhea, constipation, or abdominal pain.  Genitourinary:  Negative frequency, urgency or dysuria.  Neurologic:  No headache, confusion, dizziness, lightheadedness.    PHYSICAL EXAM  Vital Signs Last 24 Hrs  T(C): 36.7 (12 Dec 2023 08:57), Max: 37 (11 Dec 2023 19:17)  T(F): 98 (12 Dec 2023 08:57), Max: 98.6 (11 Dec 2023 19:17)  HR: 82 (12 Dec 2023 08:57) (75 - 82)  BP: 120/76 (12 Dec 2023 08:57) (113/59 - 139/82)  BP(mean): --  RR: 18 (12 Dec 2023 08:57) (16 - 18)  SpO2: 100% (12 Dec 2023 08:57) (97% - 100%)    Parameters below as of 12 Dec 2023 08:57  Patient On (Oxygen Delivery Method): room air    Constitutional: Awake, alert, in no acute distress.   HEENT: Normocephalic, atraumatic, ANUSHA, no proptosis or lid retraction.   Neck: supple, no acanthosis, no thyromegaly or palpable thyroid nodules.  Respiratory: Lungs clear to ausculation bilaterally.   Cardiovascular: regular rhythm, normal S1 and S2, no audible murmurs.   GI: soft, non-tender, non-distended, bowel sounds present, no masses appreciated.  Extremities: No lower extremity edema, peripheral pulses present.   Skin: no rashes.   Neuro: impaired fluency speech,  R upper and lower facial weakness with droop, and decreased sensation, 5/5 in l UE and L LE, R UE 4-/5, R LE 3/5, 2/4 throughout, bilateral flexor plantars  Psychiatric: AAO x 3. Normal affect/mood.     LABS  CBC - WBC/HGB/HTC/PLT: 7.33/12.9/39.2/278 (12-12-23)  BMP: Na/K/Cl/Bicarb/BUN/Cr/Gluc: 136/3.7/108/20/5/0.44/194 (12-12-23)  Anion Gap: 8 (12-12-23)  eGFR: 125 (12-12-23)  Calcium: 8.2 (12-12-23)  Phosphorus: -- (12-12-23)  Magnesium: -- (12-12-23)  LFT - Alb/Tprot/Tbili/Dbili/AlkPhos/ALT/AST: 3.8/--/0.2/--/142/20/12 (12-12-23)  PT/aPTT/INR: 9.8/27.5/0.86 (12-11-23)     HISTORY OF PRESENT ILLNESS  43F w/ PMH of asthma, DMII, possible absence seizures (pt not completely sure)and Migraines with R sided hemiplegia who presents for constant pounding, stabbing headache on the right side of her head for two weeks. She notes that within a few hours of her headache onset she developed R sided facial weakness and numbness that progressed to R UE and R LE numbness and weakness. The migraine is associated with nausea, photophobia and phonophobia. She has had migraines like this before in the past. The last time it was this bad was in october this year she was in [place] at the time and admitted to [facility] and had a brain MRI done and EEG and results were all normal. She is taking toradol injections at home along with keppra 250 mg, metoprolol, for her headaches and it has not helped her. She notes that she previously tried topiramate for her headaches but experiences tingling sensations so her doctor stopped the medication. She also notes that her doctor told her that she cannot be on any triptan medications due to the hemiplegia she experiences with her migraines. Code stroke was called in the ED and head CT and CTA H&N were negative. MRI short stroke protocol with DWI ws done and did not show evidence of acute ischemic infarct. Migraine was still not improved at all since receiving, tylenol, toradol depakote, reglan and IVF in the ED so patient was admitted.     FAMILY HISTORY: maternal grandmother stroke and migraines, no hx of seizures in family.   SOCIAL HISTORY: negative for tobacco, alcohol, or ilicit drug use.  SURGICAL HISTORY: appendectomy and cholecystectomy    Endocrine consulted for uncontrolled type 2 diabetes. Pt seen in ED. Reports headache is worse and has nausea with one episode of vomiting. Is trying to eat though. History is limited due to current symptoms. Pt reports A1C was 18% in October and started on metformin and mounjaro 5mg. Last mounjaro dose approx 2 weeks. Recently on 2 month course of various steroids for asthma c/b RSV infection. Prednisone 20 mg self tapered over about a week, last dose 3-4 days ago. Has not been checking glucose on steroids but previously using scarlet. Diet seems appropriate. Has neruologist in [place] and [place], but will be spending the next month in [facility] and then in [place] of [place].    CAPILLARY BLOOD GLUCOSE & INSULIN RECEIVED  325 mg/dL (12-11 @ 17:04)  378 mg/dL (12-11 @ 17:07)  305 mg/dL (12-11 @ 21:59)  225 mg/dL (12-11 @ 23:09) - Lispro 4  216 mg/dL (12-11 @ 23:42)  194 mg/dL (12-12 @ 05:30)  194 mg/dL (12-12 @ 07:21)      PAST MEDICAL & SURGICAL HISTORY  As per history of present illness.     FAMILY HISTORY  - Diabetes: yes  - Thyroid: denies  - Autoimmune: denies    ALLERGIES  No Known Allergies    CURRENT MEDICATIONS  dextrose 5%. 1000 milliLiter(s) IV Continuous <Continuous>  dextrose 5%. 1000 milliLiter(s) IV Continuous <Continuous>  dextrose 50% Injectable 25 Gram(s) IV Push once  dextrose 50% Injectable 12.5 Gram(s) IV Push once  dextrose 50% Injectable 25 Gram(s) IV Push once  dextrose Oral Gel 15 Gram(s) Oral once PRN  glucagon  Injectable 1 milliGRAM(s) IntraMuscular once  insulin lispro (ADMELOG) corrective regimen sliding scale   SubCutaneous Before meals and at bedtime  insulin lispro Injectable (ADMELOG) 5 Unit(s) SubCutaneous three times a day before meals  ketorolac   Injectable 15 milliGRAM(s) IV Push every 8 hours  levETIRAcetam 250 milliGRAM(s) Oral two times a day  metoclopramide Injectable 10 milliGRAM(s) IV Push every 8 hours PRN  metoprolol succinate ER 25 milliGRAM(s) Oral daily  verapamil  milliGRAM(s) Oral daily    REVIEW OF SYSTEMS  Constitutional:  Negative fever, chills or loss of appetite.  Eyes:  Negative blurry vision or double vision.  Cardiovascular:  Negative for chest pain or palpitations.  Respiratory:  Negative for cough, wheezing, or shortness of breath.   Gastrointestinal:  Negative for nausea, vomiting, diarrhea, constipation, or abdominal pain.  Genitourinary:  Negative frequency, urgency or dysuria.  Neurologic:  No headache, confusion, dizziness, lightheadedness.    PHYSICAL EXAM  Vital Signs Last 24 Hrs  T(C): 36.7 (12 Dec 2023 08:57), Max: 37 (11 Dec 2023 19:17)  T(F): 98 (12 Dec 2023 08:57), Max: 98.6 (11 Dec 2023 19:17)  HR: 82 (12 Dec 2023 08:57) (75 - 82)  BP: 120/76 (12 Dec 2023 08:57) (113/59 - 139/82)  BP(mean): --  RR: 18 (12 Dec 2023 08:57) (16 - 18)  SpO2: 100% (12 Dec 2023 08:57) (97% - 100%)    Parameters below as of 12 Dec 2023 08:57  Patient On (Oxygen Delivery Method): room air    Constitutional: Awake, alert, in no acute distress.   HEENT: Normocephalic, atraumatic, ANUSHA, no proptosis or lid retraction.   Neck: supple, no acanthosis, no thyromegaly or palpable thyroid nodules.  Respiratory: Lungs clear to ausculation bilaterally.   Cardiovascular: regular rhythm, normal S1 and S2, no audible murmurs.   GI: soft, non-tender, non-distended, bowel sounds present, no masses appreciated.  Extremities: No lower extremity edema, peripheral pulses present.   Skin: no rashes.   Neuro: impaired fluency speech,  R upper and lower facial weakness with droop, and decreased sensation, 5/5 in l UE and L LE, R UE 4-/5, R LE 3/5, 2/4 throughout, bilateral flexor plantars  Psychiatric: AAO x 3. Normal affect/mood.     LABS  CBC - WBC/HGB/HTC/PLT: 7.33/12.9/39.2/278 (12-12-23)  BMP: Na/K/Cl/Bicarb/BUN/Cr/Gluc: 136/3.7/108/20/5/0.44/194 (12-12-23)  Anion Gap: 8 (12-12-23)  eGFR: 125 (12-12-23)  Calcium: 8.2 (12-12-23)  Phosphorus: -- (12-12-23)  Magnesium: -- (12-12-23)  LFT - Alb/Tprot/Tbili/Dbili/AlkPhos/ALT/AST: 3.8/--/0.2/--/142/20/12 (12-12-23)  PT/aPTT/INR: 9.8/27.5/0.86 (12-11-23)

## 2023-12-12 NOTE — CONSULT NOTE ADULT - ASSESSMENT
43F w/ PMH of asthma, DMII, possible absence seizures (pt not completely sure)and Migraines with R sided hemiplegia who presents for migraine headache associated with R sided hemiplegia. CT head and CTA H&N were negative. Stroke team evaluated patient and MRI DWI was also negative for acute infarct. Patient's symptoms are likely due to Migraine with hemiplegia,    A1C: 10.7 %  BUN: 5  Creatinine: 0.44  GFR: 125  Weight: 77.1  BMI: 28.3 43F w/ PMH of asthma, DMII, possible absence seizures (pt not completely sure)and Migraines with R sided hemiplegia who presents for migraine headache associated with R sided hemiplegia. CT head and CTA H&N were negative. Stroke team evaluated patient and MRI DWI was also negative for acute infarct. Patient's symptoms are likely due to Migraine with hemiplegia, also with uncontrolled type 2 diabetes,    A1C: 10.7 %  BUN: 5  Creatinine: 0.44  GFR: 125  Weight: 77.1  BMI: 28.3

## 2023-12-12 NOTE — CONSULT NOTE ADULT - CONSULT REASON
new diabetes diagnosis with hyperglycemia;
stroke code - R sided weakness/speech
Migraine with R sided hemiplegia
Co-management

## 2023-12-12 NOTE — PHYSICAL THERAPY INITIAL EVALUATION ADULT - GROSSLY INTACT, SENSORY
RLE/RUE impaired light touch, deep pressure, proprioception and noxious sensation/Left UE/Left LE/Grossly Intact

## 2023-12-12 NOTE — OCCUPATIONAL THERAPY INITIAL EVALUATION ADULT - PERTINENT HX OF CURRENT PROBLEM, REHAB EVAL
43F with PMH of migraines and DM2 presenting with headaches and R sided hemiplegia, admitted to the general neurology service for further workup.

## 2023-12-12 NOTE — H&P ADULT - ATTENDING COMMENTS
43-year-old woman with past medical history of hemiplegic migraines presenting with status migrainosus associated with right sided weakness involving face arm and leg.  Home medications include Toradol subcutaneous injections Keppra and metoprolol.  She was given Tylenol Reglan and valproate total of 1 g persistence of the headache and focal weakness.  MRI was unrevealing with negative DWI sequence FLAIR.  Most likely hemiplegic migraine we will try verapamil and consider gepants, as well as obtaining contrast-enhanced imaging of the brain and spinal cord.  Will consider genetic testing for hemiplegic migraine. 43-year-old woman with past medical history of hemiplegic migraines presenting with status migrainosus associated with right sided weakness involving face arm and leg.  Home medications include Toradol subcutaneous injections Keppra and metoprolol.  She was given Tylenol Reglan and valproate total of 1 g persistence of the headache and focal weakness.  MRI was unrevealing with negative DWI sequence FLAIR.  Most likely hemiplegic migraine we will try verapamil and consider gepants, as well as obtaining contrast-enhanced imaging of the brain and spinal cord.  Will consider genetic testing for hemiplegic migraine. On examination the patient was Salazar positive, she also had inconsistent effort limited weakness on the right side may be suggestive of functional overlay despite initial history of migraine for 2 weeks, on review of photographs to assess for baseline facial asymmetry, she appeared to be in good health on Sunday at a bar without any of the symptoms suggesting her current headache and focality were since Monday. Given concerns of hemiplegic migraine we will start verapamil 120 mg twice daily can increase to twice a day as needed. We can also use Toradol and hydration for symptomatic management of pain as well as Reglan if she becomes nauseous. 43-year-old woman with past medical history of uncontrolled diabetes, hemiplegic migraines presenting with status migrainosus associated with right sided weakness involving face arm and leg.  Home medications include Toradol subcutaneous injections Keppra and metoprolol.  She was given Tylenol Reglan and valproate total of 1 g persistence of the headache and focal weakness.  MRI was unrevealing with negative DWI sequence FLAIR.  Most likely hemiplegic migraine we will try verapamil and consider gepants, as well as obtaining contrast-enhanced imaging of the brain and spinal cord.  Will consider genetic testing for hemiplegic migraine. On examination the patient was Salazar positive, she also had inconsistent effort limited weakness on the right side may be suggestive of functional overlay despite initial history of migraine for 2 weeks, on review of photographs to assess for baseline facial asymmetry, she appeared to be in good health on Sunday at a bar without any of the symptoms suggesting her current headache and focality were since Monday. Given concerns of hemiplegic migraine we will start verapamil 120 mg twice daily can increase to twice a day as needed. We can also use Toradol and hydration for symptomatic management of pain as well as Reglan if she becomes nauseous. As for her poorly controlled diabetes, we held the home metformin, and started lispro insulin 5 units with sliding scale and consulted endocrinology.

## 2023-12-12 NOTE — OCCUPATIONAL THERAPY INITIAL EVALUATION ADULT - RANGE OF MOTION EXAMINATION, LOWER EXTREMITY
L hip/knee PROM WFL; unable to manipulate R ankle into neutral position/Left LE Active ROM was WFL (within functional limits)

## 2023-12-12 NOTE — PHYSICAL THERAPY INITIAL EVALUATION ADULT - PERTINENT HX OF CURRENT PROBLEM, REHAB EVAL
43F w/ PMH of asthma, DMII, possible absence seizures (pt not completely sure)and Migraines with R sided hemiplegia who presents for migraine headache associated with R sided hemiplegia. CT head and CTA H&N were negative. Stroke team evaluated patient and MRI DWI was also negative for acute infarct. Patient's symptoms are likely due to Migraine with hemiplegia,

## 2023-12-12 NOTE — H&P ADULT - HISTORY OF PRESENT ILLNESS
HPI:  43F w/ PMH of asthma, DMII, possible absence seizures (pt not completely sure)and Migraines with R sided hemiplegia who presents for constant pounding, stabbing headache on the right side of her head for two weeks. She notes that within a few hours of her headache onset she developed R sided facial weakness and numbness that progressed to R UE and R LE numbness and weakness. The migraine is associated with nausea, photophobia and phonophobia. She has had migraines like this before in the past. The last time it was this bad was in october this year she was in [place] at the time and admitted to [facility] and had a brain MRI done and EEG and results were all normal. She is taking toradol injections at home along with keppra 250 mg, metoprolol, for her headaches and it has not helped her. She notes that she previously tried topiramate for her headaches but experiences tingling sensations so her doctor stopped the medication. She also notes that her doctor told her that she cannot be on any triptan medications due to the hemiplegia she experiences with her migraines. Code stroke was called in the ED and head CT and CTA H&N were negative. MRI short stroke protocol with DWI ws done and did not show evidence of acute ischemic infarct. Migraine was still not improved at all since receiving, tylenol, toradol depakote, reglan and IVF in the ED so patient was admitted. .   She denies any fevers, chills, chest pain, SOB, dysuria, or diarrhea.     FAMILY HISTORY: maternal grandmother stroke and migraines, no hx of seizures in family.   SOCIAL HISTORY: negative for tobacco, alcohol, or ilicit drug use.  SURGICAL HISTORY: appendectomy and cholecystectomy    Allergies  No Known Allergies      NEURO:   MENTAL STATUS: AAOx3  LANG/SPEECH: impaired fluency speech with paucities, intact naming, repetition & comprehension  CRANIAL NERVES:  II: Pupils equal and reactive, no RAPD, normal visual field  III, IV, VI: EOM intact, no gaze preference or deviation  V: normal  VII: R upper and lower facial weakness and decreased sensation  VIII: normal hearing to speech  MOTOR: 5/5 in l UE and L LE, R UE 4-/5, R LE 3/5  REFLEXES: 2/4 throughout, bilateral flexor plantars  SENSORY: loss of sensation on R Ue and R LE to light touch, vibration and pin prick,   COORD: Normal finger to nose and heel to shin, no tremor, no dysmetria on R UE and R LE, L Ue no dysmetria but limited by weakness. R LE unable to perform HTS due to weakness.  Gait: deferred.       LABS:                        14.8   8.20  )-----------( 260      ( 11 Dec 2023 17:07 )             43.5     12-11    132<L>  |  102  |  7   ----------------------------<  305<H>  4.3   |  19<L>  |  0.43<L>    Ca    8.6      11 Dec 2023 21:59    TPro  7.0  /  Alb  4.0  /  TBili  0.2  /  DBili  x   /  AST  16  /  ALT  22  /  AlkPhos  165<H>  12-11    Hemoglobin A1C:   Vitamin B12   PT/INR - ( 11 Dec 2023 20:07 )   PT: 9.8 sec;   INR: 0.86          PTT - ( 11 Dec 2023 20:07 )  PTT:27.5 sec  CAPILLARY BLOOD GLUCOSE  325 (11 Dec 2023 18:29)      POCT Blood Glucose.: 325 mg/dL (11 Dec 2023 17:04)      Urinalysis Basic - ( 11 Dec 2023 21:59 )    Color: x / Appearance: x / SG: x / pH: x  Gluc: 305 mg/dL / Ketone: x  / Bili: x / Urobili: x   Blood: x / Protein: x / Nitrite: x   Leuk Esterase: x / RBC: x / WBC x   Sq Epi: x / Non Sq Epi: x / Bacteria: x            Microbiology:      RADIOLOGY, EKG AND ADDITIONAL TESTS:   CT Head: negative  CTA H&N: negative  MRI DWI: so sign of acute ischemic infarct   HPI:  43F w/ PMH of asthma, DMII, possible absence seizures (pt not completely sure)and Migraines with R sided hemiplegia who presents for constant pounding, stabbing headache on the right side of her head for two weeks. She notes that within a few hours of her headache onset she developed R sided facial weakness and numbness that progressed to R UE and R LE numbness and weakness. The migraine is associated with nausea, photophobia and phonophobia. She has had migraines like this before in the past. The last time it was this bad was in october this year she was in [place] at the time and admitted to [facility] and had a brain MRI done and EEG and results were all normal. She is taking toradol injections at home along with keppra 250 mg, metoprolol, for her headaches and it has not helped her. She notes that she previously tried topiramate for her headaches but experiences tingling sensations so her doctor stopped the medication. She also notes that her doctor told her that she cannot be on any triptan medications due to the hemiplegia she experiences with her migraines. Code stroke was called in the ED and head CT and CTA H&N were negative. MRI short stroke protocol with DWI ws done and did not show evidence of acute ischemic infarct. Migraine was still not improved at all since receiving, tylenol, toradol depakote, reglan and IVF in the ED so patient was admitted. .   She denies any fevers, chills, chest pain, SOB, dysuria, or diarrhea.     FAMILY HISTORY: maternal grandmother stroke and migraines, no hx of seizures in family.   SOCIAL HISTORY: negative for tobacco, alcohol, or ilicit drug use.  SURGICAL HISTORY: appendectomy and cholecystectomy    Allergies  No Known Allergies      NEURO:   MENTAL STATUS: AAOx3  LANG/SPEECH: impaired fluency speech with paucities, intact naming, repetition & comprehension  CRANIAL NERVES:  II: Pupils equal and reactive, no RAPD, normal visual field  III, IV, VI: EOM intact, no gaze preference or deviation  V: normal  VII: R upper and lower facial weakness and decreased sensation  VIII: normal hearing to speech  MOTOR: 5/5 in l UE and L LE, R UE 4-/5, R LE 3/5  REFLEXES: 2/4 throughout, bilateral flexor plantars  SENSORY: loss of sensation on R Ue and R LE to light touch, vibration and pin prick,   COORD: Normal finger to nose and heel to shin, no tremor, no dysmetria on R UE and R LE, L Ue no dysmetria but limited by weakness. R LE unable to perform HTS due to weakness.  Gait: deferred.       LABS:                        14.8   8.20  )-----------( 260      ( 11 Dec 2023 17:07 )             43.5     12-11    132<L>  |  102  |  7   ----------------------------<  305<H>  4.3   |  19<L>  |  0.43<L>    Ca    8.6      11 Dec 2023 21:59    TPro  7.0  /  Alb  4.0  /  TBili  0.2  /  DBili  x   /  AST  16  /  ALT  22  /  AlkPhos  165<H>  12-11    Hemoglobin A1C:   Vitamin B12   PT/INR - ( 11 Dec 2023 20:07 )   PT: 9.8 sec;   INR: 0.86          PTT - ( 11 Dec 2023 20:07 )  PTT:27.5 sec  CAPILLARY BLOOD GLUCOSE  325 (11 Dec 2023 18:29)      POCT Blood Glucose.: 325 mg/dL (11 Dec 2023 17:04)      Urinalysis Basic - ( 11 Dec 2023 21:59 )    Color: x / Appearance: x / SG: x / pH: x  Gluc: 305 mg/dL / Ketone: x  / Bili: x / Urobili: x   Blood: x / Protein: x / Nitrite: x   Leuk Esterase: x / RBC: x / WBC x   Sq Epi: x / Non Sq Epi: x / Bacteria: x            Microbiology:      RADIOLOGY, EKG AND ADDITIONAL TESTS:   CT Head: negative  CTA H&N: negative  MRI DWI: so sign of acute ischemic infarct

## 2023-12-13 LAB
ALBUMIN SERPL ELPH-MCNC: 3.3 G/DL — SIGNIFICANT CHANGE UP (ref 3.3–5)
ALP SERPL-CCNC: 134 U/L — HIGH (ref 40–120)
ALT FLD-CCNC: 17 U/L — SIGNIFICANT CHANGE UP (ref 10–45)
ANION GAP SERPL CALC-SCNC: 9 MMOL/L — SIGNIFICANT CHANGE UP (ref 5–17)
AST SERPL-CCNC: 13 U/L — SIGNIFICANT CHANGE UP (ref 10–40)
BILIRUB SERPL-MCNC: 0.3 MG/DL — SIGNIFICANT CHANGE UP (ref 0.2–1.2)
BUN SERPL-MCNC: 9 MG/DL — SIGNIFICANT CHANGE UP (ref 7–23)
CALCIUM SERPL-MCNC: 8.4 MG/DL — SIGNIFICANT CHANGE UP (ref 8.4–10.5)
CHLORIDE SERPL-SCNC: 106 MMOL/L — SIGNIFICANT CHANGE UP (ref 96–108)
CO2 SERPL-SCNC: 19 MMOL/L — LOW (ref 22–31)
CREAT SERPL-MCNC: 0.45 MG/DL — LOW (ref 0.5–1.3)
CULTURE RESULTS: ABNORMAL
EGFR: 124 ML/MIN/1.73M2 — SIGNIFICANT CHANGE UP
GLUCOSE BLDC GLUCOMTR-MCNC: 138 MG/DL — HIGH (ref 70–99)
GLUCOSE BLDC GLUCOMTR-MCNC: 138 MG/DL — HIGH (ref 70–99)
GLUCOSE BLDC GLUCOMTR-MCNC: 160 MG/DL — HIGH (ref 70–99)
GLUCOSE BLDC GLUCOMTR-MCNC: 238 MG/DL — HIGH (ref 70–99)
GLUCOSE BLDC GLUCOMTR-MCNC: 245 MG/DL — HIGH (ref 70–99)
GLUCOSE BLDC GLUCOMTR-MCNC: 245 MG/DL — HIGH (ref 70–99)
GLUCOSE SERPL-MCNC: 322 MG/DL — HIGH (ref 70–99)
HCT VFR BLD CALC: 38.4 % — SIGNIFICANT CHANGE UP (ref 34.5–45)
HGB BLD-MCNC: 12.7 G/DL — SIGNIFICANT CHANGE UP (ref 11.5–15.5)
MAGNESIUM SERPL-MCNC: 1.7 MG/DL — SIGNIFICANT CHANGE UP (ref 1.6–2.6)
MCHC RBC-ENTMCNC: 31 PG — SIGNIFICANT CHANGE UP (ref 27–34)
MCHC RBC-ENTMCNC: 33.1 GM/DL — SIGNIFICANT CHANGE UP (ref 32–36)
MCV RBC AUTO: 93.7 FL — SIGNIFICANT CHANGE UP (ref 80–100)
NRBC # BLD: 0 /100 WBCS — SIGNIFICANT CHANGE UP (ref 0–0)
PLATELET # BLD AUTO: 266 K/UL — SIGNIFICANT CHANGE UP (ref 150–400)
POTASSIUM SERPL-MCNC: 3.8 MMOL/L — SIGNIFICANT CHANGE UP (ref 3.5–5.3)
POTASSIUM SERPL-SCNC: 3.8 MMOL/L — SIGNIFICANT CHANGE UP (ref 3.5–5.3)
PROT SERPL-MCNC: 6 G/DL — SIGNIFICANT CHANGE UP (ref 6–8.3)
RBC # BLD: 4.1 M/UL — SIGNIFICANT CHANGE UP (ref 3.8–5.2)
RBC # FLD: 13.6 % — SIGNIFICANT CHANGE UP (ref 10.3–14.5)
SODIUM SERPL-SCNC: 134 MMOL/L — LOW (ref 135–145)
SPECIMEN SOURCE: SIGNIFICANT CHANGE UP
WBC # BLD: 6.31 K/UL — SIGNIFICANT CHANGE UP (ref 3.8–10.5)
WBC # FLD AUTO: 6.31 K/UL — SIGNIFICANT CHANGE UP (ref 3.8–10.5)

## 2023-12-13 PROCEDURE — 72156 MRI NECK SPINE W/O & W/DYE: CPT | Mod: 26

## 2023-12-13 PROCEDURE — 99233 SBSQ HOSP IP/OBS HIGH 50: CPT

## 2023-12-13 PROCEDURE — 99232 SBSQ HOSP IP/OBS MODERATE 35: CPT

## 2023-12-13 PROCEDURE — 70553 MRI BRAIN STEM W/O & W/DYE: CPT | Mod: 26

## 2023-12-13 RX ORDER — MAGNESIUM SULFATE 500 MG/ML
4 VIAL (ML) INJECTION ONCE
Refills: 0 | Status: COMPLETED | OUTPATIENT
Start: 2023-12-13 | End: 2023-12-13

## 2023-12-13 RX ORDER — INSULIN LISPRO 100/ML
7 VIAL (ML) SUBCUTANEOUS
Refills: 0 | Status: DISCONTINUED | OUTPATIENT
Start: 2023-12-13 | End: 2023-12-14

## 2023-12-13 RX ORDER — PANTOPRAZOLE SODIUM 20 MG/1
40 TABLET, DELAYED RELEASE ORAL
Refills: 0 | Status: DISCONTINUED | OUTPATIENT
Start: 2023-12-13 | End: 2023-12-14

## 2023-12-13 RX ORDER — VERAPAMIL HCL 240 MG
120 CAPSULE, EXTENDED RELEASE PELLETS 24 HR ORAL EVERY 12 HOURS
Refills: 0 | Status: DISCONTINUED | OUTPATIENT
Start: 2023-12-13 | End: 2023-12-13

## 2023-12-13 RX ORDER — ACETAMINOPHEN 500 MG
1000 TABLET ORAL ONCE
Refills: 0 | Status: COMPLETED | OUTPATIENT
Start: 2023-12-13 | End: 2023-12-13

## 2023-12-13 RX ORDER — INSULIN GLARGINE 100 [IU]/ML
20 INJECTION, SOLUTION SUBCUTANEOUS AT BEDTIME
Refills: 0 | Status: DISCONTINUED | OUTPATIENT
Start: 2023-12-13 | End: 2023-12-14

## 2023-12-13 RX ORDER — SODIUM CHLORIDE 9 MG/ML
500 INJECTION, SOLUTION INTRAVENOUS ONCE
Refills: 0 | Status: COMPLETED | OUTPATIENT
Start: 2023-12-13 | End: 2023-12-13

## 2023-12-13 RX ADMIN — INSULIN GLARGINE 20 UNIT(S): 100 INJECTION, SOLUTION SUBCUTANEOUS at 22:41

## 2023-12-13 RX ADMIN — Medication 15 MILLIGRAM(S): at 22:42

## 2023-12-13 RX ADMIN — Medication 4 UNIT(S): at 10:13

## 2023-12-13 RX ADMIN — Medication 15 MILLIGRAM(S): at 14:54

## 2023-12-13 RX ADMIN — LEVETIRACETAM 250 MILLIGRAM(S): 250 TABLET, FILM COATED ORAL at 06:22

## 2023-12-13 RX ADMIN — Medication 25 MILLIGRAM(S): at 10:16

## 2023-12-13 RX ADMIN — Medication 25 GRAM(S): at 11:13

## 2023-12-13 RX ADMIN — SODIUM CHLORIDE 500 MILLILITER(S): 9 INJECTION, SOLUTION INTRAVENOUS at 14:46

## 2023-12-13 RX ADMIN — Medication 15 MILLIGRAM(S): at 22:57

## 2023-12-13 RX ADMIN — Medication 15 MILLIGRAM(S): at 06:22

## 2023-12-13 RX ADMIN — Medication 4: at 10:13

## 2023-12-13 RX ADMIN — LEVETIRACETAM 250 MILLIGRAM(S): 250 TABLET, FILM COATED ORAL at 18:03

## 2023-12-13 RX ADMIN — Medication 400 MILLIGRAM(S): at 10:14

## 2023-12-13 RX ADMIN — Medication 4: at 22:53

## 2023-12-13 RX ADMIN — Medication 15 MILLIGRAM(S): at 06:37

## 2023-12-13 RX ADMIN — Medication 2: at 13:36

## 2023-12-13 RX ADMIN — Medication 4 UNIT(S): at 17:59

## 2023-12-13 RX ADMIN — Medication 1000 MILLIGRAM(S): at 10:44

## 2023-12-13 RX ADMIN — Medication 4 UNIT(S): at 13:36

## 2023-12-13 RX ADMIN — Medication 120 MILLIGRAM(S): at 10:15

## 2023-12-13 RX ADMIN — PANTOPRAZOLE SODIUM 40 MILLIGRAM(S): 20 TABLET, DELAYED RELEASE ORAL at 10:14

## 2023-12-13 RX ADMIN — Medication 15 MILLIGRAM(S): at 15:24

## 2023-12-13 NOTE — SBIRT NOTE ADULT - NSSBIRTSCREENAVAIL_GEN_A_CORE
Pt reports she drinks socially. She typically has 4-5 drinks a couple of times a month. Pt has never had any substance abuse treatment. Pt does not abuse drugs./Yes

## 2023-12-13 NOTE — PROGRESS NOTE ADULT - PROBLEM SELECTOR PLAN 1
Type 2 diabetes mellitus with hyperglycemia  - Please start lantus  units at bedtime.   - Decrease lispro to  units before each meal.  - Continue lispro moderate dose sliding scale before meals and at bedtime.  - Patient's fingerstick glucose goal is 100-180 mg/dL.    - For discharge, patient can ***.    - Patient can follow up at discharge with [facility] by calling [phone] to make an appointment.      Case discussed with Dr. Campos. Primary team updated. Type 2 diabetes mellitus with hyperglycemia  - Please start lantus  units at bedtime.   - Decrease lispro to  units before each meal.  - Continue lispro moderate dose sliding scale before meals and at bedtime.  - Patient's fingerstick glucose goal is 100-180 mg/dL.    - For discharge, patient can ***.    - Patient can follow up at discharge with [facility] by calling [phone] to make an appointment.      Case discussed with Dr. Campos. Primary team updated. Type 2 diabetes mellitus with hyperglycemia  - Please increase lantus 20 units at bedtime.   - Increase lispro to 7 units before each meal.  - Continue lispro moderate dose sliding scale before meals and at bedtime.  - Patient's fingerstick glucose goal is 100-180 mg/dL.    - For discharge, patient can ***.    - Patient can follow up at discharge with [facility] by calling [phone] to make an appointment.      Case discussed with Dr. Campos. Primary team updated. Type 2 diabetes mellitus with hyperglycemia  - Please increase lantus 20 units at bedtime.   - Increase lispro to 7 units before each meal.  - Continue lispro moderate dose sliding scale before meals and at bedtime.  - Patient's fingerstick glucose goal is 100-180 mg/dL.    - For discharge, patient can ***.    - Patient can follow up at discharge with [facility] by calling [phone] to make an appointment.      Case discussed with Dr. Campos. Primary team updated.

## 2023-12-14 ENCOUNTER — TRANSCRIPTION ENCOUNTER (OUTPATIENT)
Age: 43
End: 2023-12-14

## 2023-12-14 VITALS
TEMPERATURE: 98 F | SYSTOLIC BLOOD PRESSURE: 132 MMHG | HEART RATE: 81 BPM | OXYGEN SATURATION: 97 % | DIASTOLIC BLOOD PRESSURE: 83 MMHG | RESPIRATION RATE: 18 BRPM

## 2023-12-14 LAB
ALBUMIN SERPL ELPH-MCNC: 3.6 G/DL — SIGNIFICANT CHANGE UP (ref 3.3–5)
ALBUMIN SERPL ELPH-MCNC: 3.6 G/DL — SIGNIFICANT CHANGE UP (ref 3.3–5)
ALP SERPL-CCNC: 123 U/L — HIGH (ref 40–120)
ALP SERPL-CCNC: 123 U/L — HIGH (ref 40–120)
ALT FLD-CCNC: 21 U/L — SIGNIFICANT CHANGE UP (ref 10–45)
ALT FLD-CCNC: 21 U/L — SIGNIFICANT CHANGE UP (ref 10–45)
ANION GAP SERPL CALC-SCNC: 8 MMOL/L — SIGNIFICANT CHANGE UP (ref 5–17)
ANION GAP SERPL CALC-SCNC: 8 MMOL/L — SIGNIFICANT CHANGE UP (ref 5–17)
AST SERPL-CCNC: 14 U/L — SIGNIFICANT CHANGE UP (ref 10–40)
AST SERPL-CCNC: 14 U/L — SIGNIFICANT CHANGE UP (ref 10–40)
BILIRUB SERPL-MCNC: 0.3 MG/DL — SIGNIFICANT CHANGE UP (ref 0.2–1.2)
BILIRUB SERPL-MCNC: 0.3 MG/DL — SIGNIFICANT CHANGE UP (ref 0.2–1.2)
BUN SERPL-MCNC: 8 MG/DL — SIGNIFICANT CHANGE UP (ref 7–23)
BUN SERPL-MCNC: 8 MG/DL — SIGNIFICANT CHANGE UP (ref 7–23)
CALCIUM SERPL-MCNC: 8.7 MG/DL — SIGNIFICANT CHANGE UP (ref 8.4–10.5)
CALCIUM SERPL-MCNC: 8.7 MG/DL — SIGNIFICANT CHANGE UP (ref 8.4–10.5)
CHLORIDE SERPL-SCNC: 109 MMOL/L — HIGH (ref 96–108)
CHLORIDE SERPL-SCNC: 109 MMOL/L — HIGH (ref 96–108)
CHOLEST SERPL-MCNC: 166 MG/DL — SIGNIFICANT CHANGE UP
CHOLEST SERPL-MCNC: 166 MG/DL — SIGNIFICANT CHANGE UP
CO2 SERPL-SCNC: 19 MMOL/L — LOW (ref 22–31)
CO2 SERPL-SCNC: 19 MMOL/L — LOW (ref 22–31)
CREAT SERPL-MCNC: 0.46 MG/DL — LOW (ref 0.5–1.3)
CREAT SERPL-MCNC: 0.46 MG/DL — LOW (ref 0.5–1.3)
EGFR: 123 ML/MIN/1.73M2 — SIGNIFICANT CHANGE UP
EGFR: 123 ML/MIN/1.73M2 — SIGNIFICANT CHANGE UP
GLUCOSE BLDC GLUCOMTR-MCNC: 127 MG/DL — HIGH (ref 70–99)
GLUCOSE BLDC GLUCOMTR-MCNC: 127 MG/DL — HIGH (ref 70–99)
GLUCOSE BLDC GLUCOMTR-MCNC: 167 MG/DL — HIGH (ref 70–99)
GLUCOSE BLDC GLUCOMTR-MCNC: 167 MG/DL — HIGH (ref 70–99)
GLUCOSE SERPL-MCNC: 178 MG/DL — HIGH (ref 70–99)
GLUCOSE SERPL-MCNC: 178 MG/DL — HIGH (ref 70–99)
HCT VFR BLD CALC: 38.2 % — SIGNIFICANT CHANGE UP (ref 34.5–45)
HCT VFR BLD CALC: 38.2 % — SIGNIFICANT CHANGE UP (ref 34.5–45)
HDLC SERPL-MCNC: 42 MG/DL — LOW
HDLC SERPL-MCNC: 42 MG/DL — LOW
HGB BLD-MCNC: 12.8 G/DL — SIGNIFICANT CHANGE UP (ref 11.5–15.5)
HGB BLD-MCNC: 12.8 G/DL — SIGNIFICANT CHANGE UP (ref 11.5–15.5)
LIPID PNL WITH DIRECT LDL SERPL: 112 MG/DL — HIGH
LIPID PNL WITH DIRECT LDL SERPL: 112 MG/DL — HIGH
MAGNESIUM SERPL-MCNC: 2 MG/DL — SIGNIFICANT CHANGE UP (ref 1.6–2.6)
MAGNESIUM SERPL-MCNC: 2 MG/DL — SIGNIFICANT CHANGE UP (ref 1.6–2.6)
MCHC RBC-ENTMCNC: 31 PG — SIGNIFICANT CHANGE UP (ref 27–34)
MCHC RBC-ENTMCNC: 31 PG — SIGNIFICANT CHANGE UP (ref 27–34)
MCHC RBC-ENTMCNC: 33.5 GM/DL — SIGNIFICANT CHANGE UP (ref 32–36)
MCHC RBC-ENTMCNC: 33.5 GM/DL — SIGNIFICANT CHANGE UP (ref 32–36)
MCV RBC AUTO: 92.5 FL — SIGNIFICANT CHANGE UP (ref 80–100)
MCV RBC AUTO: 92.5 FL — SIGNIFICANT CHANGE UP (ref 80–100)
NON HDL CHOLESTEROL: 124 MG/DL — SIGNIFICANT CHANGE UP
NON HDL CHOLESTEROL: 124 MG/DL — SIGNIFICANT CHANGE UP
NRBC # BLD: 0 /100 WBCS — SIGNIFICANT CHANGE UP (ref 0–0)
NRBC # BLD: 0 /100 WBCS — SIGNIFICANT CHANGE UP (ref 0–0)
PLATELET # BLD AUTO: 261 K/UL — SIGNIFICANT CHANGE UP (ref 150–400)
PLATELET # BLD AUTO: 261 K/UL — SIGNIFICANT CHANGE UP (ref 150–400)
POTASSIUM SERPL-MCNC: 3.9 MMOL/L — SIGNIFICANT CHANGE UP (ref 3.5–5.3)
POTASSIUM SERPL-MCNC: 3.9 MMOL/L — SIGNIFICANT CHANGE UP (ref 3.5–5.3)
POTASSIUM SERPL-SCNC: 3.9 MMOL/L — SIGNIFICANT CHANGE UP (ref 3.5–5.3)
POTASSIUM SERPL-SCNC: 3.9 MMOL/L — SIGNIFICANT CHANGE UP (ref 3.5–5.3)
PROT SERPL-MCNC: 6.2 G/DL — SIGNIFICANT CHANGE UP (ref 6–8.3)
PROT SERPL-MCNC: 6.2 G/DL — SIGNIFICANT CHANGE UP (ref 6–8.3)
RBC # BLD: 4.13 M/UL — SIGNIFICANT CHANGE UP (ref 3.8–5.2)
RBC # BLD: 4.13 M/UL — SIGNIFICANT CHANGE UP (ref 3.8–5.2)
RBC # FLD: 13.6 % — SIGNIFICANT CHANGE UP (ref 10.3–14.5)
RBC # FLD: 13.6 % — SIGNIFICANT CHANGE UP (ref 10.3–14.5)
SODIUM SERPL-SCNC: 136 MMOL/L — SIGNIFICANT CHANGE UP (ref 135–145)
SODIUM SERPL-SCNC: 136 MMOL/L — SIGNIFICANT CHANGE UP (ref 135–145)
TRIGL SERPL-MCNC: 60 MG/DL — SIGNIFICANT CHANGE UP
TRIGL SERPL-MCNC: 60 MG/DL — SIGNIFICANT CHANGE UP
TSH SERPL-MCNC: 2.05 UIU/ML — SIGNIFICANT CHANGE UP (ref 0.27–4.2)
TSH SERPL-MCNC: 2.05 UIU/ML — SIGNIFICANT CHANGE UP (ref 0.27–4.2)
WBC # BLD: 7.06 K/UL — SIGNIFICANT CHANGE UP (ref 3.8–10.5)
WBC # BLD: 7.06 K/UL — SIGNIFICANT CHANGE UP (ref 3.8–10.5)
WBC # FLD AUTO: 7.06 K/UL — SIGNIFICANT CHANGE UP (ref 3.8–10.5)
WBC # FLD AUTO: 7.06 K/UL — SIGNIFICANT CHANGE UP (ref 3.8–10.5)

## 2023-12-14 PROCEDURE — 36415 COLL VENOUS BLD VENIPUNCTURE: CPT

## 2023-12-14 PROCEDURE — 97165 OT EVAL LOW COMPLEX 30 MIN: CPT

## 2023-12-14 PROCEDURE — 70496 CT ANGIOGRAPHY HEAD: CPT | Mod: MA

## 2023-12-14 PROCEDURE — 81001 URINALYSIS AUTO W/SCOPE: CPT

## 2023-12-14 PROCEDURE — 85730 THROMBOPLASTIN TIME PARTIAL: CPT

## 2023-12-14 PROCEDURE — 70450 CT HEAD/BRAIN W/O DYE: CPT | Mod: MA

## 2023-12-14 PROCEDURE — 82010 KETONE BODYS QUAN: CPT

## 2023-12-14 PROCEDURE — 87086 URINE CULTURE/COLONY COUNT: CPT

## 2023-12-14 PROCEDURE — 83735 ASSAY OF MAGNESIUM: CPT

## 2023-12-14 PROCEDURE — 80053 COMPREHEN METABOLIC PANEL: CPT

## 2023-12-14 PROCEDURE — 84443 ASSAY THYROID STIM HORMONE: CPT

## 2023-12-14 PROCEDURE — 99232 SBSQ HOSP IP/OBS MODERATE 35: CPT

## 2023-12-14 PROCEDURE — 82962 GLUCOSE BLOOD TEST: CPT

## 2023-12-14 PROCEDURE — 85027 COMPLETE CBC AUTOMATED: CPT

## 2023-12-14 PROCEDURE — 99291 CRITICAL CARE FIRST HOUR: CPT | Mod: 25

## 2023-12-14 PROCEDURE — 84702 CHORIONIC GONADOTROPIN TEST: CPT

## 2023-12-14 PROCEDURE — 84295 ASSAY OF SERUM SODIUM: CPT

## 2023-12-14 PROCEDURE — 84132 ASSAY OF SERUM POTASSIUM: CPT

## 2023-12-14 PROCEDURE — 70498 CT ANGIOGRAPHY NECK: CPT | Mod: MA

## 2023-12-14 PROCEDURE — 97161 PT EVAL LOW COMPLEX 20 MIN: CPT

## 2023-12-14 PROCEDURE — 80061 LIPID PANEL: CPT

## 2023-12-14 PROCEDURE — 99233 SBSQ HOSP IP/OBS HIGH 50: CPT

## 2023-12-14 PROCEDURE — 93005 ELECTROCARDIOGRAM TRACING: CPT

## 2023-12-14 PROCEDURE — 82330 ASSAY OF CALCIUM: CPT

## 2023-12-14 PROCEDURE — 85610 PROTHROMBIN TIME: CPT

## 2023-12-14 PROCEDURE — 0042T: CPT | Mod: MA

## 2023-12-14 PROCEDURE — 97535 SELF CARE MNGMENT TRAINING: CPT

## 2023-12-14 PROCEDURE — 82803 BLOOD GASES ANY COMBINATION: CPT

## 2023-12-14 PROCEDURE — 72156 MRI NECK SPINE W/O & W/DYE: CPT

## 2023-12-14 PROCEDURE — 97116 GAIT TRAINING THERAPY: CPT

## 2023-12-14 PROCEDURE — 85025 COMPLETE CBC W/AUTO DIFF WBC: CPT

## 2023-12-14 PROCEDURE — 80307 DRUG TEST PRSMV CHEM ANLYZR: CPT

## 2023-12-14 PROCEDURE — 96374 THER/PROPH/DIAG INJ IV PUSH: CPT

## 2023-12-14 PROCEDURE — 83036 HEMOGLOBIN GLYCOSYLATED A1C: CPT

## 2023-12-14 PROCEDURE — 96375 TX/PRO/DX INJ NEW DRUG ADDON: CPT

## 2023-12-14 PROCEDURE — 97530 THERAPEUTIC ACTIVITIES: CPT

## 2023-12-14 PROCEDURE — 70551 MRI BRAIN STEM W/O DYE: CPT | Mod: MA

## 2023-12-14 PROCEDURE — 70553 MRI BRAIN STEM W/O & W/DYE: CPT

## 2023-12-14 PROCEDURE — A9585: CPT

## 2023-12-14 RX ORDER — ATORVASTATIN CALCIUM 80 MG/1
20 TABLET, FILM COATED ORAL AT BEDTIME
Refills: 0 | Status: DISCONTINUED | OUTPATIENT
Start: 2023-12-14 | End: 2023-12-14

## 2023-12-14 RX ORDER — MAGNESIUM SULFATE 500 MG/ML
2 VIAL (ML) INJECTION ONCE
Refills: 0 | Status: COMPLETED | OUTPATIENT
Start: 2023-12-14 | End: 2023-12-14

## 2023-12-14 RX ORDER — ACETAMINOPHEN 500 MG
975 TABLET ORAL ONCE
Refills: 0 | Status: COMPLETED | OUTPATIENT
Start: 2023-12-14 | End: 2023-12-14

## 2023-12-14 RX ORDER — ATORVASTATIN CALCIUM 80 MG/1
1 TABLET, FILM COATED ORAL
Qty: 0 | Refills: 0 | DISCHARGE
Start: 2023-12-14

## 2023-12-14 RX ORDER — TIRZEPATIDE 15 MG/.5ML
5 INJECTION, SOLUTION SUBCUTANEOUS
Qty: 90 | Refills: 0
Start: 2023-12-14 | End: 2024-03-12

## 2023-12-14 RX ORDER — METFORMIN HYDROCHLORIDE 850 MG/1
1 TABLET ORAL
Qty: 180 | Refills: 0
Start: 2023-12-14 | End: 2024-03-12

## 2023-12-14 RX ORDER — ATORVASTATIN CALCIUM 80 MG/1
1 TABLET, FILM COATED ORAL
Qty: 90 | Refills: 0
Start: 2023-12-14 | End: 2024-03-12

## 2023-12-14 RX ADMIN — Medication 15 MILLIGRAM(S): at 06:25

## 2023-12-14 RX ADMIN — Medication 2: at 09:37

## 2023-12-14 RX ADMIN — Medication 15 MILLIGRAM(S): at 06:10

## 2023-12-14 RX ADMIN — PANTOPRAZOLE SODIUM 40 MILLIGRAM(S): 20 TABLET, DELAYED RELEASE ORAL at 06:09

## 2023-12-14 RX ADMIN — Medication 25 MILLIGRAM(S): at 09:40

## 2023-12-14 RX ADMIN — Medication 7 UNIT(S): at 09:37

## 2023-12-14 RX ADMIN — Medication 15 MILLIGRAM(S): at 13:24

## 2023-12-14 RX ADMIN — Medication 975 MILLIGRAM(S): at 10:06

## 2023-12-14 RX ADMIN — LEVETIRACETAM 250 MILLIGRAM(S): 250 TABLET, FILM COATED ORAL at 06:09

## 2023-12-14 RX ADMIN — Medication 7 UNIT(S): at 13:31

## 2023-12-14 RX ADMIN — Medication 25 GRAM(S): at 10:07

## 2023-12-14 NOTE — DISCHARGE NOTE PROVIDER - NSFOLLOWUPCLINICS_GEN_ALL_ED_FT
[facility]  Endocrinology  [address] [place] [address]  Phone: [phone]  Fax:      [facility]  Endocrinology  [address] [place] [address]  Phone: [phone]  Fax:

## 2023-12-14 NOTE — DISCHARGE NOTE NURSING/CASE MANAGEMENT/SOCIAL WORK - NSDCFUADDAPPT_GEN_ALL_CORE_FT
follow up at discharge with [facility] by calling [phone] to make an appointment.      follow up with PCP within 1-2 weeks     follow up with Psychiatry follow up at discharge with [facility] by calling [phone] to make an appointment.      follow up with PCP within 1-2 weeks     follow up with Psychiatry

## 2023-12-14 NOTE — DISCHARGE NOTE PROVIDER - NSDCCPCAREPLAN_GEN_ALL_CORE_FT
PRINCIPAL DISCHARGE DIAGNOSIS  Diagnosis: Headache  Assessment and Plan of Treatment: You came to the hospital for headaches. We did an MRI of your brain which did not show any acute stroke.   You should follow up with our Neurologist when you     PRINCIPAL DISCHARGE DIAGNOSIS  Diagnosis: Headache  Assessment and Plan of Treatment: You came to the hospital for headaches. We did an MRI of your brain which did not show any acute stroke. We repeated the MRI with a more detailed scan including contrast and that did not show any abnormalities as well.  We gave you medications for the headache and it improved.   You should follow up with our Neurologist when you are discharged. Because the toradol is a very strong NSAID, we would like you to try taking Naproxen/Alleve 500mg and Tylenol for the headaches as an alternative.      SECONDARY DISCHARGE DIAGNOSES  Diagnosis: Diabetes  Assessment and Plan of Treatment: You have a diagnosis of type 2 diabetes (sometimes called type 2 "diabetes mellitus"). This is a disorder that disrupts the way your body uses sugar. All the cells in your body need sugar to work normally. Sugar gets into the cells with the help of a hormone called insulin. If there is not enough insulin, or if the body stops responding to insulin, sugar builds up in the blood. That is what happens to people with diabetes. Type 2 diabetes usually causes no symptoms. When symptoms do occur, they include the need to urinate often, intense thirst and blurry vision. Even though type 2 diabetes might not make you feel sick, it can cause serious problems over time, if it is not treated. The disorder can lead to heart attacks, strokes, kidney disease, vision problems (or even blindness), pain or loss of feeling in the hands and feet, and the need to have fingers, toes, or other body parts removed (amputated). In addition to maintaining an active lifestyle, losing weight, eating right, and not smoking it is important to take your diabetes medications as directed to control your blood sugar and prevent the possible complications from this disease.

## 2023-12-14 NOTE — DISCHARGE NOTE NURSING/CASE MANAGEMENT/SOCIAL WORK - PATIENT PORTAL LINK FT
You can access the FollowMyHealth Patient Portal offered by [facility] by registering at the following website: [email or web address]. By joining [facility]’s FollowMyHealth portal, you will also be able to view your health information using other applications (apps) compatible with our system. You can access the FollowMyHealth Patient Portal offered by [facility] by registering at the following website: [email or web address]. By joining [facility]’s FollowMyHealth portal, you will also be able to view your health information using other applications (apps) compatible with our system.

## 2023-12-14 NOTE — PROGRESS NOTE ADULT - PROBLEM SELECTOR PLAN 1
Type 2 diabetes mellitus with hyperglycemia  - Please continue lantus 20 units at bedtime.   - Continue lispro to 7 units before each meal.  - Continue lispro moderate dose sliding scale before meals and at bedtime.  - Patient's fingerstick glucose goal is 100-180 mg/dL.    - For discharge: back to home meds - metformin 1000mg BID and mounjaro 5mg weekly  - Patient can follow up at discharge with [facility] by calling [phone] to make an appointment. Type 2 diabetes mellitus with hyperglycemia  - Please continue lantus 20 units at bedtime.   - Continue lispro to 7 units before each meal.  - Continue lispro moderate dose sliding scale before meals and at bedtime.  - Patient's fingerstick glucose goal is 100-180 mg/dL.    - For discharge: back to home meds - metformin 1000mg BID and mounjaro 5mg weekly  - Patient can follow up at discharge with [facility] by calling [phone] to make an appointment.

## 2023-12-14 NOTE — DISCHARGE NOTE NURSING/CASE MANAGEMENT/SOCIAL WORK - NSDCPEFALRISK_GEN_ALL_CORE
For information on Fall & Injury Prevention, visit: [email or web address] OR  [email or web address] OR  https://www.cdc.gov/steadi/patient.html For information on Fall & Injury Prevention, visit: [email or web address] OR  [email or web address] OR  https://www.cdc.gov/steadi/patient.html

## 2023-12-14 NOTE — PROGRESS NOTE ADULT - ASSESSMENT
43F with PMH of migraines and DM2 presenting with headaches and R sided hemiplegia, admitted to the general neurology service for further workup.     #Migraines  - plan per primary team  - MRI negative for acute intracranial process    #DM2  - confirm home medications  - sliding scale insulin, finger sticks  - monitor insulin requirement, and add basal bolus if indicated  - appreciate input from endocrinology  -HbA1c is >10, would recommend outpatient follow up for medication adjustment  - diabetes educator consult    moderate level of medical decision making required for this case, including discussion of case with neurology team and presence of two chronic medical issues.     
43F w/ PMH of asthma, DMII, possible absence seizures (pt not completely sure)and Migraines with R sided hemiplegia who presents for migraine headache associated with R sided hemiplegia. CT head and CTA H&N were negative. Stroke team evaluated patient and MRI DWI was also negative for acute infarct. Patient's symptoms are likely due to Migraine with hemiplegia, also with uncontrolled type 2 diabetes,    A1C: 10.7 %  BUN: 9  Creatinine: 0.45  GFR: 124  Weight: 89.5  BMI: 35
43F with PMH of migraines and DM2 presenting with headaches and R sided hemiplegia, admitted to the general neurology service for further workup.     #Migraines  - plan per primary team  - MRI negative for acute intracranial process    #DM2 w/ hyperglycemia  -Endocrine following.   -On lantus 20 lispro 7  -f/u endocrine for final recommendations  -Lifestyle modifications were discussed in detail      #Obesity  -BMI 35  -Discussed lifestyle modifications in depth    MDM Mod  Reviewed labs, imaging, records  Case d/w neuro team  Management of DM2 with hyperglycemia   
43F w/ PMH of asthma, DMII, possible absence seizures (pt not completely sure)and Migraines with R sided hemiplegia who presents for migraine headache associated with R sided hemiplegia. CT head and CTA H&N were negative. Stroke team evaluated patient and MRI DWI was also negative for acute infarct. Patient's symptoms are likely due to Migraine with hemiplegia, also with uncontrolled type 2 diabetes,    A1C: 10.7 %  BUN: 8  Creatinine: 0.46  GFR: 123  Weight: 89.5  BMI: 35
43F w/ PMH of asthma, DMII, possible absence seizures (pt not completely sure)and Migraines with R sided hemiplegia who presents for migraine headache associated with R sided hemiplegia. CT head and CTA H&N were negative. Stroke team evaluated patient and MRI DWI was also negative for acute infarct. Patient's symptoms are likely due to Migraine with hemiplegia,    Plan    #Status Migrainosis w/ r sided hemiplegia  - Toradol 15mg iv q8hrs  - S/p valproic acid  mg  - monitor for symptom improvement  - neuro checks q8  - reglan PRN for nausea  - resume home keppra 250 mg BID  - resume home metorpolol 25 mg ER qd  - PT/OT for r sided weakness  - Hold verapamil 120 mg daily given soft BPs  - Ofirmev 1g and Magnesium today    #DMII, chronic  - ISS  - glucose accuchecks    Diet: Consistent carb  DVT ppx: lovenox    Case discussed with Dr. Troncoso

## 2023-12-14 NOTE — DISCHARGE NOTE PROVIDER - CARE PROVIDER_API CALL
Nawaf Troncoso  Neurology  [address] [address]  Phone: [phone]  Fax: [phone]  Follow Up Time: 1 month    Bruce Campos  Endocrinology/Metab/Diabetes  [address] [address]  Phone: [phone]  Fax: [phone]  Follow Up Time: 1 month   Nawaf Troncoso  Neurology  [address] [address]  Phone: [phone]  Fax: [phone]  Follow Up Time: 1 month    Bruce Campos  Endocrinology/Metab/Diabetes  [address] [address]  Phone: [phone]  Fax: [phone]  Follow Up Time: 1 month

## 2023-12-14 NOTE — PROGRESS NOTE ADULT - REASON FOR ADMISSION
Migraine with r side hemiplegia

## 2023-12-14 NOTE — DISCHARGE NOTE PROVIDER - NSDCFUADDAPPT_GEN_ALL_CORE_FT
follow up at discharge with [facility] by calling [phone] to make an appointment.   follow up at discharge with [facility] by calling [phone] to make an appointment.   follow up at discharge with [facility] by calling [phone] to make an appointment.      follow up with PCP within 1-2 weeks     follow up with Psychiatry follow up at discharge with [facility] by calling [phone] to make an appointment.      follow up with PCP within 1-2 weeks     follow up with Psychiatry

## 2023-12-14 NOTE — DISCHARGE NOTE PROVIDER - NSDCMRMEDTOKEN_GEN_ALL_CORE_FT
Keppra 250 mg oral tablet: 1 tab(s) orally 2 times a day  ketorolac 30 mg/mL injectable solution: 30 milligram(s) intramuscularly once a day as needed for  headache  metFORMIN 1000 mg oral tablet, extended release: 1 tab(s) orally once a day  metoprolol succinate 25 mg oral capsule, extended release: 1 cap(s) orally once a day   atorvastatin 20 mg oral tablet: 1 tab(s) orally once a day (at bedtime)  Keppra 250 mg oral tablet: 1 tab(s) orally 2 times a day  ketorolac 30 mg/mL injectable solution: 30 milligram(s) intramuscularly once a day as needed for  headache  metFORMIN 1000 mg oral tablet, extended release: 1 tab(s) orally every 12 hours  metoprolol succinate 25 mg oral capsule, extended release: 1 cap(s) orally once a day  Mounjaro 5 mg/0.5 mL subcutaneous solution: 5 milligram(s) subcutaneously once a week

## 2023-12-14 NOTE — DISCHARGE NOTE PROVIDER - CARE PROVIDERS DIRECT ADDRESSES
,DirectAddress_Unknown,[email or web address] ,DirectAddress_Unknown,[email or web address]

## 2023-12-14 NOTE — DISCHARGE NOTE PROVIDER - PROVIDER TOKENS
PROVIDER:[TOKEN:[[id number]],FOLLOWUP:[1 month]],PROVIDER:[TOKEN:[[id number]],FOLLOWUP:[1 month]] PROVIDER:[TOKEN:[[id number]],FOLLOWUP:[1 month]],PROVIDER:[TOKEN:[[id number]],FOLLOWUP:[1 month]]

## 2023-12-14 NOTE — DISCHARGE NOTE PROVIDER - HOSPITAL COURSE
43F w/ PMH of asthma, DMII, possible absence seizures (pt not completely sure)and Migraines with R sided hemiplegia who presents for migraine headache associated with R sided hemiplegia. CT head and CTA H&N were negative. Stroke team evaluated patient and MRI short stroke protcol was negative for acute infarct. She was admitted to the neurology service due to persistent headache, resistant to treatment. MRI w/ and w/o contrast was repeated due to persistent hemiplegia, which showed hyperintensity in the R ventral midbrain without DWI correlate, question of artifact vs migraine sequalae.     Plan  #Status Migrainosis w/ r sided hemiplegia  - Toradol 15mg iv q8hrs  - S/p valproic acid  mg without improvement.  - W/ control of sugars (see below), magnesium, IVF, Ofirmev with improvement of headaches  - reglan PRN for nausea  - resume home keppra 250 mg BID  - resume home metorpolol 25 mg ER qd  - PT/OT for r sided weakness - refused acute rehab.  - Hold verapamil 120 mg daily given soft BPs    #DMII, chronic  - Started on Lantus 20units, Lispro 7units premeal  - Transition to ___ on discharge  - Endocrinology followup on discharge. 43F w/ PMH of asthma, DMII, possible absence seizures (pt not completely sure)and Migraines with R sided hemiplegia who presents for migraine headache associated with R sided hemiplegia. CT head and CTA H&N were negative. Stroke team evaluated patient and MRI short stroke protcol was negative for acute infarct. She was admitted to the neurology service due to persistent headache, resistant to treatment. MRI w/ and w/o contrast was repeated due to persistent hemiplegia, which showed hyperintensity in the R ventral midbrain without DWI correlate, question of artifact.    Plan  #Status Migrainosis w/ r sided hemiplegia - suspect functional component  - Toradol 15mg iv q8hrs -> wean on discharge  - S/p valproic acid  mg without improvement.  - W/ control of sugars (see below), magnesium, IVF, Ofirmev with improvement of headaches  - reglan PRN for nausea  - resume home keppra 250 mg BID  - resume home metorpolol 25 mg ER qd  - PT/OT for r sided weakness - refused acute rehab.  - Trialed but discontinued verapamil 120 mg daily given soft BPs, poor response    #DMII, chronic  - Started on Lantus 20units, Lispro 7units premeal  - Transition to ___ on discharge  - Endocrinology followup on discharge. 43F w/ PMH of asthma, DMII, possible absence seizures (pt not completely sure)and Migraines with R sided hemiplegia who presents for migraine headache associated with R sided hemiplegia. CT head and CTA H&N were negative. Stroke team evaluated patient and MRI short stroke protcol was negative for acute infarct. She was admitted to the neurology service due to persistent headache, resistant to treatment. MRI w/ and w/o contrast was repeated due to persistent hemiplegia, which showed hyperintensity in the R ventral midbrain without DWI correlate, question of artifact.    Plan  #Status Migrainosis w/ r sided hemiplegia - suspect functional component  - Toradol 15mg iv q8hrs -> discussed weaning on discharge, advised can take Naproxen 500mg for , avoid overuse headache.   - S/p valproic acid  mg without improvement.  - W/ control of sugars (see below), magnesium, IVF, Ofirmev with improvement of headaches  - resume home keppra 250 mg BID  - resume home metorpolol 25 mg ER qd  - PT/OT for r sided weakness   - Trialed but discontinued verapamil 120 mg daily given soft BPs, poor response    #DMII, chronic  - Started on Lantus 20units, Lispro 7units premeal  - Transition to ___ on discharge  - Endocrinology followup on discharge. 43F w/ PMH of asthma, DMII, possible absence seizures (pt not completely sure)and Migraines with R sided hemiplegia who presents for migraine headache associated with R sided hemiplegia. CT head and CTA H&N were negative. Stroke team evaluated patient and MRI short stroke protcol was negative for acute infarct. She was admitted to the neurology service due to persistent headache, resistant to treatment. MRI w/ and w/o contrast was repeated due to persistent hemiplegia, which showed hyperintensity in the R ventral midbrain without DWI correlate, question of artifact.    Plan  #Status Migrainosis w/ r sided hemiplegia - suspect functional component  - Toradol 15mg iv q8hrs -> discussed weaning on discharge, advised can take Naproxen 500mg for , avoid overuse headache.   - S/p valproic acid  mg without improvement.  - W/ control of sugars (see below), magnesium, IVF, Ofirmev with improvement of headaches  - resume home keppra 250 mg BID  - resume home metorpolol 25 mg ER qd  - PT/OT for r sided weakness   - Trialed but discontinued verapamil 120 mg daily given soft BPs, poor response    #DMII, chronic  - Started on Lantus 20units, Lispro 7units premeal  - Transition to ___ on discharge  - Endocrinology followup on discharge.    #HLD - LDL elevated  - Start Atorvastatin 20mg qhs  - PCP follow up on discharge    New Medications: Atorvastatin 20mg qhs, _______  Items to follow up on discharge: Follow up with Neurologist for further headache management. Follow up with Psychiatry vs Psychology for Functional overlay. Follow up with PCP and Endocrinology for poorly controlled DM and new HLD. 43F w/ PMH of asthma, DMII, possible absence seizures (pt not completely sure)and Migraines with R sided hemiplegia who presents for migraine headache associated with R sided hemiplegia. CT head and CTA H&N were negative. Stroke team evaluated patient and MRI short stroke protcol was negative for acute infarct. She was admitted to the neurology service due to persistent headache, resistant to treatment. MRI w/ and w/o contrast was repeated due to persistent hemiplegia, which showed hyperintensity in the R ventral midbrain without DWI correlate, question of artifact.    Plan  #Status Migrainosis w/ r sided hemiplegia - suspect functional component  - Toradol 15mg iv q8hrs -> discussed weaning on discharge, advised can take Naproxen 500mg for , avoid overuse headache.   - S/p valproic acid  mg without improvement.  - W/ control of sugars (see below), magnesium, IVF, Ofirmev with improvement of headaches  - resume home keppra 250 mg BID  - resume home metorpolol 25 mg ER qd  - PT/OT for r sided weakness   - Trialed but discontinued verapamil 120 mg daily given soft BPs, poor response    #DMII, chronic  - On Lantus 20units, Lispro 7units premeal  - Transition to Metformin 1000mg BID and Monjaro 5 q1week on discharge  - Endocrinology followup on discharge.    #HLD - LDL elevated  - Start Atorvastatin 20mg qhs  - PCP follow up on discharge    New Medications: Atorvastatin 20mg qhs,  Increase Metformin 1000mg BID, Continue Monjaro 5 q1week  Items to follow up on discharge: Follow up with Neurologist for further headache management. Follow up with Psychiatry vs Psychology for Functional overlay. Follow up with PCP and Endocrinology for poorly controlled DM and new HLD.

## 2023-12-14 NOTE — PROGRESS NOTE ADULT - SUBJECTIVE AND OBJECTIVE BOX
SUBJECTIVE / INTERVAL HPI: Patient was seen and examined this morning. Pain and nausea improved. Right face droop/lid lag slightly improved. Speech more fluent. Still has weakness in right UES/LES, but can flex foot minimally today. Confirmed she was recently diagnosed with diabetes, but she thinks she had for awhile and was not told. Has fruit addiction, somewhat suppressed with mounjaro. 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    CAPILLARY BLOOD GLUCOSE & INSULIN RECEIVED  204 mg/dL (12-12 @ 16:50) - Lispro 5+4. Ate salmon, veggies, and couple of bites of potato.  244 mg/dL (12-12 @ 21:20)  264 mg/dL (12-12 @ 22:21) - Lantus 14 + lispro 6. Denies eating/drinking anything overnight.  322 mg/dL (12-13 @ 05:30)  238 mg/dL (12-13 @ 09:55) - Lispro 4+4. Ate 1 bread and fruit cup.  160 mg/dL (12-13 @ 13:09)      REVIEW OF SYSTEMS  Constitutional:  Negative fever, chills or loss of appetite.  Eyes:  Negative blurry vision or double vision.  Cardiovascular:  Negative for chest pain or palpitations.  Respiratory:  Negative for cough, wheezing, or shortness of breath.    Gastrointestinal:  Negative for nausea, vomiting, diarrhea, constipation, or abdominal pain.  Genitourinary:  Negative frequency, urgency or dysuria.  Neurologic:  No headache, confusion, dizziness, lightheadedness.    PHYSICAL EXAM  Vital Signs Last 24 Hrs  T(C): 37.2 (13 Dec 2023 12:40), Max: 37.2 (13 Dec 2023 12:40)  T(F): 98.9 (13 Dec 2023 12:40), Max: 98.9 (13 Dec 2023 12:40)  HR: 87 (13 Dec 2023 12:40) (80 - 87)  BP: 113/74 (13 Dec 2023 12:40) (98/60 - 122/80)  BP(mean): --  RR: 18 (13 Dec 2023 12:40) (18 - 18)  SpO2: 96% (13 Dec 2023 12:40) (96% - 100%)    Parameters below as of 13 Dec 2023 12:40  Patient On (Oxygen Delivery Method): room air      Constitutional: Awake, alert, in no acute distress.   HEENT: Normocephalic, atraumatic, ANUSHA, no proptosis or lid retraction.   Neck: supple, no acanthosis, no thyromegaly or palpable thyroid nodules.  Respiratory: Lungs clear to ausculation bilaterally.   Cardiovascular: regular rhythm, normal S1 and S2, no audible murmurs.   GI: soft, non-tender, non-distended, bowel sounds present, no masses appreciated.  Extremities: No lower extremity edema, peripheral pulses present.   Skin: no rashes.   Neuro: impaired fluency speech,  R upper and lower facial weakness with droop, and decreased sensation, 5/5 in l UE and L LE, R UE 4-/5, R LE 3/5, 2/4 throughout, bilateral flexor plantars  Psychiatric: AAO x 3. Normal affect/mood.     LABS  CBC - WBC/HGB/HTC/PLT: 6.31/12.7/38.4/266 (12-13-23)  BMP - Na/K/Cl/Bicarb/BUN/Cr/Gluc/AG/eGFR: 134/3.8/106/19/9/0.45/322/9/124 (12-13-23)  Ca - 8.4 (12-13-23)  Phos - -- (12-13-23)  Mg - 1.7 (12-13-23)  LFT - Alb/Tprot/Tbili/Dbili/AlkPhos/ALT/AST: 3.3/--/0.3/--/134/17/13 (12-13-23)  PT/aPTT/INR: 9.8/27.5/0.86 (12-11-23)       MEDICATIONS  MEDICATIONS  (STANDING):  dextrose 5%. 1000 milliLiter(s) (100 mL/Hr) IV Continuous <Continuous>  dextrose 5%. 1000 milliLiter(s) (50 mL/Hr) IV Continuous <Continuous>  dextrose 50% Injectable 12.5 Gram(s) IV Push once  dextrose 50% Injectable 25 Gram(s) IV Push once  dextrose 50% Injectable 25 Gram(s) IV Push once  enoxaparin Injectable 40 milliGRAM(s) SubCutaneous every 24 hours  glucagon  Injectable 1 milliGRAM(s) IntraMuscular once  influenza   Vaccine 0.5 milliLiter(s) IntraMuscular once  insulin glargine Injectable (LANTUS) 14 Unit(s) SubCutaneous at bedtime  insulin lispro (ADMELOG) corrective regimen sliding scale   SubCutaneous Before meals and at bedtime  insulin lispro Injectable (ADMELOG) 4 Unit(s) SubCutaneous three times a day before meals  ketorolac   Injectable 15 milliGRAM(s) IV Push every 8 hours  levETIRAcetam 250 milliGRAM(s) Oral two times a day  metoprolol succinate ER 25 milliGRAM(s) Oral daily  pantoprazole    Tablet 40 milliGRAM(s) Oral before breakfast    MEDICATIONS  (PRN):  dextrose Oral Gel 15 Gram(s) Oral once PRN Blood Glucose LESS THAN 70 milliGRAM(s)/deciliter  metoclopramide Injectable 10 milliGRAM(s) IV Push every 8 hours PRN Nausea and/or Vomiting        
S: Still has a headache but has somewhat improved. No further N/V. Otherwise well.     Vital Signs Last 24 Hrs  T(C): 36.9 (14 Dec 2023 09:35), Max: 37.2 (13 Dec 2023 12:40)  T(F): 98.5 (14 Dec 2023 09:35), Max: 98.9 (13 Dec 2023 12:40)  HR: 81 (14 Dec 2023 09:35) (69 - 87)  BP: 132/83 (14 Dec 2023 09:35) (108/69 - 132/83)  BP(mean): --  RR: 18 (14 Dec 2023 09:35) (17 - 18)  SpO2: 97% (14 Dec 2023 09:35) (96% - 98%)    Parameters below as of 14 Dec 2023 09:35  Patient On (Oxygen Delivery Method): room air        PHYSICAL EXAM:  General: sitting up in bed, slightly uncomfortable appearing 2/2 headache  HEENT: MMM  Cardiovascular: +S1/S2, RRR, no mrg  Respiratory: CTA B/L; no W/R/R  Gastrointestinal: soft, NT/ND; +BSx4  Extremities: WWP; no edema  Psychiatric: pleasant mood and affect  Dermatologic: no appreciable wounds or damage to the skin                          12.8   7.06  )-----------( 261      ( 14 Dec 2023 05:30 )             38.2   12-14    136  |  109<H>  |  8   ----------------------------<  178<H>  3.9   |  19<L>  |  0.46<L>    Ca    8.7      14 Dec 2023 05:30  Mg     2.0     12-14    TPro  6.2  /  Alb  3.6  /  TBili  0.3  /  DBili  x   /  AST  14  /  ALT  21  /  AlkPhos  123<H>  12-14  
Still has a headache.  Also endorses nausea and vomiting overnight.      Remaining ROS negative     PHYSICAL EXAM:    General: sitting up in bed, slightly uncomfortable appearing 2/2 headache  HEENT: MMM  Cardiovascular: +S1/S2, RRR, no mrg  Respiratory: CTA B/L; no W/R/R  Gastrointestinal: soft, NT/ND; +BSx4  Extremities: WWP; no edema  Psychiatric: pleasant mood and affect  Dermatologic: no appreciable wounds or damage to the skin    VITAL SIGNS:  Vital Signs Last 24 Hrs  T(C): 36.7 (13 Dec 2023 05:33), Max: 36.7 (12 Dec 2023 12:24)  T(F): 98 (13 Dec 2023 05:33), Max: 98 (12 Dec 2023 12:24)  HR: 80 (13 Dec 2023 05:33) (80 - 96)  BP: 98/60 (13 Dec 2023 05:33) (98/60 - 122/80)  BP(mean): --  RR: 18 (13 Dec 2023 05:33) (18 - 18)  SpO2: 97% (13 Dec 2023 05:33) (97% - 100%)    Parameters below as of 13 Dec 2023 05:33  Patient On (Oxygen Delivery Method): room air      MEDICATIONS:  MEDICATIONS  (STANDING):  dextrose 5%. 1000 milliLiter(s) (50 mL/Hr) IV Continuous <Continuous>  dextrose 5%. 1000 milliLiter(s) (100 mL/Hr) IV Continuous <Continuous>  dextrose 50% Injectable 25 Gram(s) IV Push once  dextrose 50% Injectable 12.5 Gram(s) IV Push once  dextrose 50% Injectable 25 Gram(s) IV Push once  enoxaparin Injectable 40 milliGRAM(s) SubCutaneous every 24 hours  glucagon  Injectable 1 milliGRAM(s) IntraMuscular once  influenza   Vaccine 0.5 milliLiter(s) IntraMuscular once  insulin glargine Injectable (LANTUS) 14 Unit(s) SubCutaneous at bedtime  insulin lispro (ADMELOG) corrective regimen sliding scale   SubCutaneous Before meals and at bedtime  insulin lispro Injectable (ADMELOG) 4 Unit(s) SubCutaneous three times a day before meals  ketorolac   Injectable 15 milliGRAM(s) IV Push every 8 hours  lactated ringers. 1000 milliLiter(s) (70 mL/Hr) IV Continuous <Continuous>  levETIRAcetam 250 milliGRAM(s) Oral two times a day  metoprolol succinate ER 25 milliGRAM(s) Oral daily  pantoprazole    Tablet 40 milliGRAM(s) Oral before breakfast  verapamil 120 milliGRAM(s) Oral every 12 hours    MEDICATIONS  (PRN):  dextrose Oral Gel 15 Gram(s) Oral once PRN Blood Glucose LESS THAN 70 milliGRAM(s)/deciliter  metoclopramide Injectable 10 milliGRAM(s) IV Push every 8 hours PRN Nausea and/or Vomiting      ALLERGIES:  Allergies    No Known Allergies    Intolerances        LABS:                        12.7   6.31  )-----------( 266      ( 13 Dec 2023 05:30 )             38.4     12-13    134<L>  |  106  |  9   ----------------------------<  322<H>  3.8   |  19<L>  |  0.45<L>    Ca    8.4      13 Dec 2023 05:30  Mg     1.7     12-13    TPro  6.0  /  Alb  3.3  /  TBili  0.3  /  DBili  x   /  AST  13  /  ALT  17  /  AlkPhos  134<H>  12-13    PT/INR - ( 11 Dec 2023 20:07 )   PT: 9.8 sec;   INR: 0.86          PTT - ( 11 Dec 2023 20:07 )  PTT:27.5 sec  Urinalysis Basic - ( 13 Dec 2023 05:30 )    Color: x / Appearance: x / SG: x / pH: x  Gluc: 322 mg/dL / Ketone: x  / Bili: x / Urobili: x   Blood: x / Protein: x / Nitrite: x   Leuk Esterase: x / RBC: x / WBC x   Sq Epi: x / Non Sq Epi: x / Bacteria: x      CAPILLARY BLOOD GLUCOSE  325 (11 Dec 2023 18:29)      POCT Blood Glucose.: 238 mg/dL (13 Dec 2023 09:55)      RADIOLOGY & ADDITIONAL TESTS: Reviewed.
Neurology Progress Note    Interval History:  The patient was seen and examined at the bedside. Initially headache 10/10. Gave Ofirmev and magnesium, says headache is slightly improved.      PAST MEDICAL & SURGICAL HISTORY:          Medications:  dextrose 5%. 1000 milliLiter(s) IV Continuous <Continuous>  dextrose 5%. 1000 milliLiter(s) IV Continuous <Continuous>  dextrose 50% Injectable 25 Gram(s) IV Push once  dextrose 50% Injectable 12.5 Gram(s) IV Push once  dextrose 50% Injectable 25 Gram(s) IV Push once  dextrose Oral Gel 15 Gram(s) Oral once PRN  enoxaparin Injectable 40 milliGRAM(s) SubCutaneous every 24 hours  glucagon  Injectable 1 milliGRAM(s) IntraMuscular once  influenza   Vaccine 0.5 milliLiter(s) IntraMuscular once  insulin glargine Injectable (LANTUS) 14 Unit(s) SubCutaneous at bedtime  insulin lispro (ADMELOG) corrective regimen sliding scale   SubCutaneous Before meals and at bedtime  insulin lispro Injectable (ADMELOG) 4 Unit(s) SubCutaneous three times a day before meals  ketorolac   Injectable 15 milliGRAM(s) IV Push every 8 hours  levETIRAcetam 250 milliGRAM(s) Oral two times a day  metoclopramide Injectable 10 milliGRAM(s) IV Push every 8 hours PRN  metoprolol succinate ER 25 milliGRAM(s) Oral daily  pantoprazole    Tablet 40 milliGRAM(s) Oral before breakfast  verapamil 120 milliGRAM(s) Oral every 12 hours      Vital Signs Last 24 Hrs  T(C): 37.2 (13 Dec 2023 12:40), Max: 37.2 (13 Dec 2023 12:40)  T(F): 98.9 (13 Dec 2023 12:40), Max: 98.9 (13 Dec 2023 12:40)  HR: 87 (13 Dec 2023 12:40) (80 - 87)  BP: 113/74 (13 Dec 2023 12:40) (98/60 - 122/80)  BP(mean): --  RR: 18 (13 Dec 2023 12:40) (18 - 18)  SpO2: 96% (13 Dec 2023 12:40) (96% - 100%)    Parameters below as of 13 Dec 2023 12:40  Patient On (Oxygen Delivery Method): room air        NEURO:   MENTAL STATUS: AAOx3  LANG/SPEECH: Speech is fluent, nondysarthric. Intact naming, repetition & comprehension  CRANIAL NERVES:  II: Pupils equal and reactive, no RAPD, normal visual field  III, IV, VI: EOM intact, no gaze preference or deviation  V: intact   VII: R upper and lower facial weakness and decreased sensation  VIII: normal hearing to speech  MOTOR: 5/5 in l UE and L LE, R UE 4/5, R LE 4/5. Question of voitional component, Salazar equivocal  REFLEXES: 2/4 throughout, bilateral flexor plantars  SENSORY: loss of sensation on R Ue and R LE to light touch, vibration and pin prick,   COORD: Normal finger to nose and heel to shin, no tremor, no dysmetria on R UE and R LE, L Ue no dysmetria but limited by weakness. R LE unable to perform HTS due to weakness.  Gait: deferred.       Labs:  CBC Full  -  ( 13 Dec 2023 05:30 )  WBC Count : 6.31 K/uL  RBC Count : 4.10 M/uL  Hemoglobin : 12.7 g/dL  Hematocrit : 38.4 %  Platelet Count - Automated : 266 K/uL  Mean Cell Volume : 93.7 fl  Mean Cell Hemoglobin : 31.0 pg  Mean Cell Hemoglobin Concentration : 33.1 gm/dL  Auto Neutrophil # : x  Auto Lymphocyte # : x  Auto Monocyte # : x  Auto Eosinophil # : x  Auto Basophil # : x  Auto Neutrophil % : x  Auto Lymphocyte % : x  Auto Monocyte % : x  Auto Eosinophil % : x  Auto Basophil % : x    12-13    134<L>  |  106  |  9   ----------------------------<  322<H>  3.8   |  19<L>  |  0.45<L>    Ca    8.4      13 Dec 2023 05:30  Mg     1.7     12-13    TPro  6.0  /  Alb  3.3  /  TBili  0.3  /  DBili  x   /  AST  13  /  ALT  17  /  AlkPhos  134<H>  12-13    LIVER FUNCTIONS - ( 13 Dec 2023 05:30 )  Alb: 3.3 g/dL / Pro: 6.0 g/dL / ALK PHOS: 134 U/L / ALT: 17 U/L / AST: 13 U/L / GGT: x           PT/INR - ( 11 Dec 2023 20:07 )   PT: 9.8 sec;   INR: 0.86          PTT - ( 11 Dec 2023 20:07 )  PTT:27.5 sec  Urinalysis Basic - ( 13 Dec 2023 05:30 )    Color: x / Appearance: x / SG: x / pH: x  Gluc: 322 mg/dL / Ketone: x  / Bili: x / Urobili: x   Blood: x / Protein: x / Nitrite: x   Leuk Esterase: x / RBC: x / WBC x   Sq Epi: x / Non Sq Epi: x / Bacteria: x        Assessment:  This is a 43y Female w/ h/o     Plan:
SUBJECTIVE / INTERVAL HPI: Patient was seen and examined this morning. Improving. Denies headache or nausea. Reports improved sensation in right foot and improved strength in right arm and somewhat in right leg.  Eating well.    CAPILLARY BLOOD GLUCOSE & INSULIN RECEIVED  138 mg/dL (12-13 @ 17:39) - Lispro 4 units. Ate salmon, veggies, no starch.  245 mg/dL (12-13 @ 22:39) - Lantus 20 + lispro 4. Repots stressful MRI, which may have contributed to hyperglycemia at bedtime.  178 mg/dL (12-14 @ 05:30)  167 mg/dL (12-14 @ 09:27) - Lispro 7+2. Ate eggs, 1 toast, fruit cup  127 mg/dL (12-14 @ 12:55)      REVIEW OF SYSTEMS  Constitutional:  Negative fever, chills or loss of appetite.  Eyes:  Negative blurry vision or double vision.  Cardiovascular:  Negative for chest pain or palpitations.  Respiratory:  Negative for cough, wheezing, or shortness of breath.    Gastrointestinal:  Negative for nausea, vomiting, diarrhea, constipation, or abdominal pain.  Genitourinary:  Negative frequency, urgency or dysuria.  Neurologic:  No headache, confusion, dizziness, lightheadedness.    PHYSICAL EXAM  Vital Signs Last 24 Hrs  T(C): 36.9 (14 Dec 2023 09:35), Max: 37.1 (13 Dec 2023 22:40)  T(F): 98.5 (14 Dec 2023 09:35), Max: 98.8 (13 Dec 2023 22:40)  HR: 81 (14 Dec 2023 09:35) (69 - 81)  BP: 132/83 (14 Dec 2023 09:35) (108/69 - 132/83)  BP(mean): --  RR: 18 (14 Dec 2023 09:35) (17 - 18)  SpO2: 97% (14 Dec 2023 09:35) (97% - 98%)    Parameters below as of 14 Dec 2023 09:35  Patient On (Oxygen Delivery Method): room air    Constitutional: Awake, alert, in no acute distress.   HEENT: Normocephalic, atraumatic, ANUSHA, no proptosis or lid retraction.   Neck: supple, no acanthosis, no thyromegaly or palpable thyroid nodules.  Respiratory: Lungs clear to ausculation bilaterally.   Cardiovascular: regular rhythm, normal S1 and S2, no audible murmurs.   GI: soft, non-tender, non-distended, bowel sounds present, no masses appreciated.  Extremities: No lower extremity edema, peripheral pulses present.   Skin: no rashes.   Neuro: impaired fluency speech,  R upper and lower facial weakness with droop, and decreased sensation, 5/5 in l UE and L LE, R UE 4-/5, R LE 3/5, 2/4 throughout, bilateral flexor plantars  Psychiatric: AAO x 3. Normal affect/mood.     LABS  CBC - WBC/HGB/HTC/PLT: 7.06/12.8/38.2/261 (12-14-23)  BMP - Na/K/Cl/Bicarb/BUN/Cr/Gluc/AG/eGFR: 136/3.9/109/19/8/0.46/178/8/123 (12-14-23)  Ca - 8.7 (12-14-23)  Phos - -- (12-14-23)  Mg - 2.0 (12-14-23)  LFT - Alb/Tprot/Tbili/Dbili/AlkPhos/ALT/AST: 3.6/--/0.3/--/123/21/14 (12-14-23)  PT/aPTT/INR: 9.8/27.5/0.86 (12-11-23)   Thyroid Stimulating Hormone, Serum: 2.050 (12-14-23)      MEDICATIONS  MEDICATIONS  (STANDING):  atorvastatin 20 milliGRAM(s) Oral at bedtime  dextrose 5%. 1000 milliLiter(s) (100 mL/Hr) IV Continuous <Continuous>  dextrose 5%. 1000 milliLiter(s) (50 mL/Hr) IV Continuous <Continuous>  dextrose 50% Injectable 25 Gram(s) IV Push once  dextrose 50% Injectable 12.5 Gram(s) IV Push once  dextrose 50% Injectable 25 Gram(s) IV Push once  enoxaparin Injectable 40 milliGRAM(s) SubCutaneous every 24 hours  glucagon  Injectable 1 milliGRAM(s) IntraMuscular once  influenza   Vaccine 0.5 milliLiter(s) IntraMuscular once  insulin glargine Injectable (LANTUS) 20 Unit(s) SubCutaneous at bedtime  insulin lispro (ADMELOG) corrective regimen sliding scale   SubCutaneous Before meals and at bedtime  insulin lispro Injectable (ADMELOG) 7 Unit(s) SubCutaneous three times a day before meals  ketorolac   Injectable 15 milliGRAM(s) IV Push every 8 hours  levETIRAcetam 250 milliGRAM(s) Oral two times a day  metoprolol succinate ER 25 milliGRAM(s) Oral daily  pantoprazole    Tablet 40 milliGRAM(s) Oral before breakfast    MEDICATIONS  (PRN):  dextrose Oral Gel 15 Gram(s) Oral once PRN Blood Glucose LESS THAN 70 milliGRAM(s)/deciliter  metoclopramide Injectable 10 milliGRAM(s) IV Push every 8 hours PRN Nausea and/or Vomiting

## 2023-12-14 NOTE — PROGRESS NOTE ADULT - ATTENDING COMMENTS
43-year-old woman with past medical history of uncontrolled diabetes, hemiplegic migraines presenting with status migrainosus associated with right sided weakness involving face arm and leg.  Home medications include Toradol subcutaneous injections Keppra and metoprolol.  She was given Tylenol Reglan and valproate total of 1 g persistence of the headache and focal weakness.  MRI was unrevealing with negative DWI sequence FLAIR.  Most likely hemiplegic migraine we Started verapamil with modest to no response will increase dose. On examination the patient was Salazar positive, she also had inconsistent effort limited weakness on the right side may be suggestive of functional overlay despite initial history of migraine for 2 weeks, on review of photographs to assess for baseline facial asymmetry, she appeared to be in good health on Sunday at a bar without any of the symptoms suggesting her current headache and focality were since Monday. Given concerns of hemiplegic migraine we will start verapamil 120 mg daily 12/13 Which was discontinued yesterday due to hypotension and lack of effectiveness. We Used Toradol and hydration for symptomatic management of pain as well as Reglan if she becomes nauseous. As for her poorly controlled diabetes, we held the home metformin, and started lispro insulin 4 units with sliding scale started Lantus 14 units with hydration, appreciate endocrinology recommendations. Yesterday observed by the resident to have no facial asymmetry while sleeping, holding the phone in her right hand, with no apparent dystonic posturing of the right foot arguing against a physiologic deficit or dystonic cause and supporting a role for functional overlay in her presentation. Will limit outpatient Toradol use given concerns of renal toxicity if overused and medication overuse headache. Will follow-up outpatient with psychiatry and endocrinology.  Headache almost entirely resolved with 2 out of 10 pain reported at the bedside with improvement in the functional deficits. Inpatient physical therapy recommendations include home with rolling walker, outpatient PT OT.
43-year-old woman with past medical history of uncontrolled diabetes, hemiplegic migraines presenting with status migrainosus associated with right sided weakness involving face arm and leg.  Home medications include Toradol subcutaneous injections Keppra and metoprolol.  She was given Tylenol Reglan and valproate total of 1 g persistence of the headache and focal weakness.  MRI was unrevealing with negative DWI sequence FLAIR.  Most likely hemiplegic migraine we Started verapamil with modest to no response will increase dose. On examination the patient was Salazar positive, she also had inconsistent effort limited weakness on the right side may be suggestive of functional overlay despite initial history of migraine for 2 weeks, on review of photographs to assess for baseline facial asymmetry, she appeared to be in good health on Sunday at a bar without any of the symptoms suggesting her current headache and focality were since Monday. Given concerns of hemiplegic migraine we will start verapamil 120 mg daily can increase to twice a day 12/13. We can also use Toradol and hydration for symptomatic management of pain as well as Reglan if she becomes nauseous. As for her poorly controlled diabetes, we held the home metformin, and started lispro insulin 4 units with sliding scale started Lantus 14 units with hydration, appreciate endocrinology recommendations. We will consult psychiatry to support her mental health in the context of other interventions given the inconsistencies in history and positive findings suggestive of functional overlay.

## 2023-12-19 ENCOUNTER — TRANSCRIPTION ENCOUNTER (OUTPATIENT)
Age: 43
End: 2023-12-19

## 2023-12-19 DIAGNOSIS — G43.401 HEMIPLEGIC MIGRAINE, NOT INTRACTABLE, WITH STATUS MIGRAINOSUS: ICD-10-CM

## 2023-12-19 DIAGNOSIS — R51.9 HEADACHE, UNSPECIFIED: ICD-10-CM

## 2023-12-19 DIAGNOSIS — E66.9 OBESITY, UNSPECIFIED: ICD-10-CM

## 2023-12-19 DIAGNOSIS — I95.9 HYPOTENSION, UNSPECIFIED: ICD-10-CM

## 2023-12-19 DIAGNOSIS — Z79.84 LONG TERM (CURRENT) USE OF ORAL HYPOGLYCEMIC DRUGS: ICD-10-CM

## 2023-12-19 DIAGNOSIS — J45.909 UNSPECIFIED ASTHMA, UNCOMPLICATED: ICD-10-CM

## 2023-12-19 DIAGNOSIS — E11.65 TYPE 2 DIABETES MELLITUS WITH HYPERGLYCEMIA: ICD-10-CM

## 2023-12-19 RX ORDER — METFORMIN HYDROCHLORIDE 850 MG/1
1 TABLET ORAL
Refills: 0 | DISCHARGE

## 2023-12-19 RX ORDER — METOPROLOL TARTRATE 50 MG
1 TABLET ORAL
Refills: 0 | DISCHARGE

## 2023-12-19 RX ORDER — LEVETIRACETAM 250 MG/1
1 TABLET, FILM COATED ORAL
Refills: 0 | DISCHARGE

## 2023-12-19 RX ORDER — KETOROLAC TROMETHAMINE 30 MG/ML
30 SYRINGE (ML) INJECTION
Refills: 0 | DISCHARGE

## 2024-04-15 ENCOUNTER — APPOINTMENT (OUTPATIENT)
Facility: HOSPITAL | Age: 44
DRG: 103 | End: 2024-04-15
Payer: COMMERCIAL

## 2024-04-15 ENCOUNTER — HOSPITAL ENCOUNTER (INPATIENT)
Facility: HOSPITAL | Age: 44
LOS: 1 days | Discharge: HOME | DRG: 103 | End: 2024-04-17
Attending: STUDENT IN AN ORGANIZED HEALTH CARE EDUCATION/TRAINING PROGRAM | Admitting: FAMILY MEDICINE
Payer: COMMERCIAL

## 2024-04-15 DIAGNOSIS — R29.90 STROKE-LIKE SYMPTOM: Primary | ICD-10-CM

## 2024-04-15 LAB
ALBUMIN SERPL-MCNC: 3.6 G/DL (ref 3.5–5)
ALBUMIN/GLOB SERPL: 1 (ref 1.1–2.2)
ALP SERPL-CCNC: 111 U/L (ref 45–117)
ALT SERPL-CCNC: 19 U/L (ref 12–78)
ANION GAP SERPL CALC-SCNC: 8 MMOL/L (ref 5–15)
APPEARANCE UR: CLEAR
AST SERPL-CCNC: 14 U/L (ref 15–37)
BACTERIA URNS QL MICRO: ABNORMAL /HPF
BASOPHILS # BLD: 0 K/UL (ref 0–0.1)
BASOPHILS NFR BLD: 0 % (ref 0–1)
BILIRUB SERPL-MCNC: 0.5 MG/DL (ref 0.2–1)
BILIRUB UR QL: NEGATIVE
BUN SERPL-MCNC: 15 MG/DL (ref 6–20)
BUN/CREAT SERPL: 21 (ref 12–20)
CALCIUM SERPL-MCNC: 8.8 MG/DL (ref 8.5–10.1)
CHLORIDE SERPL-SCNC: 109 MMOL/L (ref 97–108)
CO2 SERPL-SCNC: 21 MMOL/L (ref 21–32)
COLOR UR: ABNORMAL
COMMENT:: NORMAL
CREAT SERPL-MCNC: 0.72 MG/DL (ref 0.55–1.02)
DIFFERENTIAL METHOD BLD: NORMAL
EKG ATRIAL RATE: 96 BPM
EKG DIAGNOSIS: NORMAL
EKG P AXIS: 67 DEGREES
EKG P-R INTERVAL: 158 MS
EKG Q-T INTERVAL: 354 MS
EKG QRS DURATION: 76 MS
EKG QTC CALCULATION (BAZETT): 447 MS
EKG R AXIS: 54 DEGREES
EKG T AXIS: 41 DEGREES
EKG VENTRICULAR RATE: 96 BPM
EOSINOPHIL # BLD: 0 K/UL (ref 0–0.4)
EOSINOPHIL NFR BLD: 1 % (ref 0–7)
EPITH CASTS URNS QL MICRO: ABNORMAL /LPF
ERYTHROCYTE [DISTWIDTH] IN BLOOD BY AUTOMATED COUNT: 13.2 % (ref 11.5–14.5)
EST. AVERAGE GLUCOSE BLD GHB EST-MCNC: 140 MG/DL
GLOBULIN SER CALC-MCNC: 3.7 G/DL (ref 2–4)
GLUCOSE BLD STRIP.AUTO-MCNC: 153 MG/DL (ref 65–117)
GLUCOSE BLD STRIP.AUTO-MCNC: 170 MG/DL (ref 65–117)
GLUCOSE BLD STRIP.AUTO-MCNC: 179 MG/DL (ref 65–117)
GLUCOSE BLD STRIP.AUTO-MCNC: 198 MG/DL (ref 65–117)
GLUCOSE SERPL-MCNC: 187 MG/DL (ref 65–100)
GLUCOSE UR STRIP.AUTO-MCNC: NEGATIVE MG/DL
HBA1C MFR BLD: 6.5 % (ref 4–5.6)
HCG UR QL: NEGATIVE
HCT VFR BLD AUTO: 38.7 % (ref 35–47)
HGB BLD-MCNC: 13.2 G/DL (ref 11.5–16)
HGB UR QL STRIP: ABNORMAL
IMM GRANULOCYTES # BLD AUTO: 0 K/UL (ref 0–0.04)
IMM GRANULOCYTES NFR BLD AUTO: 0 % (ref 0–0.5)
KETONES UR QL STRIP.AUTO: NEGATIVE MG/DL
LEUKOCYTE ESTERASE UR QL STRIP.AUTO: NEGATIVE
LYMPHOCYTES # BLD: 1.8 K/UL (ref 0.8–3.5)
LYMPHOCYTES NFR BLD: 33 % (ref 12–49)
MCH RBC QN AUTO: 31.7 PG (ref 26–34)
MCHC RBC AUTO-ENTMCNC: 34.1 G/DL (ref 30–36.5)
MCV RBC AUTO: 92.8 FL (ref 80–99)
MONOCYTES # BLD: 0.4 K/UL (ref 0–1)
MONOCYTES NFR BLD: 8 % (ref 5–13)
NEUTS SEG # BLD: 3.2 K/UL (ref 1.8–8)
NEUTS SEG NFR BLD: 58 % (ref 32–75)
NITRITE UR QL STRIP.AUTO: NEGATIVE
NRBC # BLD: 0 K/UL (ref 0–0.01)
NRBC BLD-RTO: 0 PER 100 WBC
PH UR STRIP: 7 (ref 5–8)
PLATELET # BLD AUTO: 308 K/UL (ref 150–400)
PMV BLD AUTO: 10.2 FL (ref 8.9–12.9)
POTASSIUM SERPL-SCNC: 3.5 MMOL/L (ref 3.5–5.1)
PROT SERPL-MCNC: 7.3 G/DL (ref 6.4–8.2)
PROT UR STRIP-MCNC: NEGATIVE MG/DL
RBC # BLD AUTO: 4.17 M/UL (ref 3.8–5.2)
RBC #/AREA URNS HPF: ABNORMAL /HPF (ref 0–5)
SERVICE CMNT-IMP: ABNORMAL
SODIUM SERPL-SCNC: 138 MMOL/L (ref 136–145)
SP GR UR REFRACTOMETRY: >1.03
SPECIMEN HOLD: NORMAL
SPECIMEN HOLD: NORMAL
UROBILINOGEN UR QL STRIP.AUTO: 0.2 EU/DL (ref 0.2–1)
WBC # BLD AUTO: 5.5 K/UL (ref 3.6–11)
WBC URNS QL MICRO: ABNORMAL /HPF (ref 0–4)

## 2024-04-15 PROCEDURE — 99285 EMERGENCY DEPT VISIT HI MDM: CPT

## 2024-04-15 PROCEDURE — 6360000004 HC RX CONTRAST MEDICATION: Performed by: HOSPITALIST

## 2024-04-15 PROCEDURE — 36415 COLL VENOUS BLD VENIPUNCTURE: CPT

## 2024-04-15 PROCEDURE — A9579 GAD-BASE MR CONTRAST NOS,1ML: HCPCS | Performed by: HOSPITALIST

## 2024-04-15 PROCEDURE — 93005 ELECTROCARDIOGRAM TRACING: CPT | Performed by: HOSPITALIST

## 2024-04-15 PROCEDURE — 70450 CT HEAD/BRAIN W/O DYE: CPT

## 2024-04-15 PROCEDURE — 96375 TX/PRO/DX INJ NEW DRUG ADDON: CPT

## 2024-04-15 PROCEDURE — G0378 HOSPITAL OBSERVATION PER HR: HCPCS

## 2024-04-15 PROCEDURE — APPNB30 APP NON BILLABLE TIME 0-30 MINS: Performed by: NURSE PRACTITIONER

## 2024-04-15 PROCEDURE — 81025 URINE PREGNANCY TEST: CPT

## 2024-04-15 PROCEDURE — 71045 X-RAY EXAM CHEST 1 VIEW: CPT

## 2024-04-15 PROCEDURE — 6360000002 HC RX W HCPCS: Performed by: STUDENT IN AN ORGANIZED HEALTH CARE EDUCATION/TRAINING PROGRAM

## 2024-04-15 PROCEDURE — 6370000000 HC RX 637 (ALT 250 FOR IP): Performed by: STUDENT IN AN ORGANIZED HEALTH CARE EDUCATION/TRAINING PROGRAM

## 2024-04-15 PROCEDURE — 85025 COMPLETE CBC W/AUTO DIFF WBC: CPT

## 2024-04-15 PROCEDURE — 82962 GLUCOSE BLOOD TEST: CPT

## 2024-04-15 PROCEDURE — 6370000000 HC RX 637 (ALT 250 FOR IP): Performed by: NURSE PRACTITIONER

## 2024-04-15 PROCEDURE — 6360000004 HC RX CONTRAST MEDICATION: Performed by: RADIOLOGY

## 2024-04-15 PROCEDURE — 81001 URINALYSIS AUTO W/SCOPE: CPT

## 2024-04-15 PROCEDURE — 2580000003 HC RX 258: Performed by: STUDENT IN AN ORGANIZED HEALTH CARE EDUCATION/TRAINING PROGRAM

## 2024-04-15 PROCEDURE — 70498 CT ANGIOGRAPHY NECK: CPT

## 2024-04-15 PROCEDURE — 6370000000 HC RX 637 (ALT 250 FOR IP): Performed by: HOSPITALIST

## 2024-04-15 PROCEDURE — 83036 HEMOGLOBIN GLYCOSYLATED A1C: CPT

## 2024-04-15 PROCEDURE — 96374 THER/PROPH/DIAG INJ IV PUSH: CPT

## 2024-04-15 PROCEDURE — 70553 MRI BRAIN STEM W/O & W/DYE: CPT

## 2024-04-15 PROCEDURE — 0042T CT BRAIN PERFUSION: CPT

## 2024-04-15 PROCEDURE — 2580000003 HC RX 258: Performed by: HOSPITALIST

## 2024-04-15 PROCEDURE — 80053 COMPREHEN METABOLIC PANEL: CPT

## 2024-04-15 PROCEDURE — 4A03X5D MEASUREMENT OF ARTERIAL FLOW, INTRACRANIAL, EXTERNAL APPROACH: ICD-10-PCS | Performed by: FAMILY MEDICINE

## 2024-04-15 RX ORDER — ATOGEPANT 60 MG/1
1 TABLET ORAL DAILY
COMMUNITY

## 2024-04-15 RX ORDER — CLOPIDOGREL BISULFATE 75 MG/1
75 TABLET ORAL
Status: COMPLETED | OUTPATIENT
Start: 2024-04-15 | End: 2024-04-15

## 2024-04-15 RX ORDER — METHOCARBAMOL 500 MG/1
500 TABLET, FILM COATED ORAL EVERY 8 HOURS PRN
Status: DISCONTINUED | OUTPATIENT
Start: 2024-04-15 | End: 2024-04-17 | Stop reason: HOSPADM

## 2024-04-15 RX ORDER — MONTELUKAST SODIUM 10 MG/1
10 TABLET ORAL DAILY
Status: DISCONTINUED | OUTPATIENT
Start: 2024-04-16 | End: 2024-04-17 | Stop reason: HOSPADM

## 2024-04-15 RX ORDER — LIDOCAINE HYDROCHLORIDE 40 MG/ML
1 SOLUTION TOPICAL
COMMUNITY

## 2024-04-15 RX ORDER — METOPROLOL SUCCINATE 25 MG/1
25 TABLET, EXTENDED RELEASE ORAL
Status: DISCONTINUED | OUTPATIENT
Start: 2024-04-15 | End: 2024-04-17 | Stop reason: HOSPADM

## 2024-04-15 RX ORDER — UBROGEPANT 100 MG/1
100 TABLET ORAL 2 TIMES DAILY PRN
COMMUNITY

## 2024-04-15 RX ORDER — TIZANIDINE 4 MG/1
4 TABLET ORAL EVERY 6 HOURS PRN
COMMUNITY

## 2024-04-15 RX ORDER — OLANZAPINE 2.5 MG/1
2.5 TABLET, FILM COATED ORAL
COMMUNITY

## 2024-04-15 RX ORDER — MONTELUKAST SODIUM 10 MG/1
10 TABLET ORAL DAILY
COMMUNITY

## 2024-04-15 RX ORDER — KETOROLAC TROMETHAMINE 30 MG/ML
60 INJECTION, SOLUTION INTRAMUSCULAR; INTRAVENOUS 2 TIMES DAILY PRN
COMMUNITY

## 2024-04-15 RX ORDER — INSULIN LISPRO 100 [IU]/ML
0-4 INJECTION, SOLUTION INTRAVENOUS; SUBCUTANEOUS NIGHTLY
Status: DISCONTINUED | OUTPATIENT
Start: 2024-04-15 | End: 2024-04-17 | Stop reason: HOSPADM

## 2024-04-15 RX ORDER — ONDANSETRON HYDROCHLORIDE 8 MG/1
8 TABLET, FILM COATED ORAL EVERY 8 HOURS PRN
COMMUNITY

## 2024-04-15 RX ORDER — ACETAMINOPHEN 325 MG/1
650 TABLET ORAL EVERY 4 HOURS PRN
Status: DISCONTINUED | OUTPATIENT
Start: 2024-04-15 | End: 2024-04-17 | Stop reason: HOSPADM

## 2024-04-15 RX ORDER — SODIUM CHLORIDE 0.9 % (FLUSH) 0.9 %
5-40 SYRINGE (ML) INJECTION 2 TIMES DAILY
Status: DISCONTINUED | OUTPATIENT
Start: 2024-04-15 | End: 2024-04-17 | Stop reason: HOSPADM

## 2024-04-15 RX ORDER — TIRZEPATIDE 5 MG/.5ML
5 INJECTION, SOLUTION SUBCUTANEOUS WEEKLY
COMMUNITY

## 2024-04-15 RX ORDER — INSULIN LISPRO 100 [IU]/ML
0-4 INJECTION, SOLUTION INTRAVENOUS; SUBCUTANEOUS
Status: DISCONTINUED | OUTPATIENT
Start: 2024-04-15 | End: 2024-04-17 | Stop reason: HOSPADM

## 2024-04-15 RX ORDER — METHOCARBAMOL 500 MG/1
500 TABLET, FILM COATED ORAL EVERY 8 HOURS PRN
COMMUNITY

## 2024-04-15 RX ORDER — ONDANSETRON 4 MG/1
4 TABLET, ORALLY DISINTEGRATING ORAL EVERY 8 HOURS PRN
Status: DISCONTINUED | OUTPATIENT
Start: 2024-04-15 | End: 2024-04-17 | Stop reason: HOSPADM

## 2024-04-15 RX ORDER — DEXAMETHASONE SODIUM PHOSPHATE 4 MG/ML
4 INJECTION, SOLUTION INTRA-ARTICULAR; INTRALESIONAL; INTRAMUSCULAR; INTRAVENOUS; SOFT TISSUE ONCE
Status: COMPLETED | OUTPATIENT
Start: 2024-04-15 | End: 2024-04-15

## 2024-04-15 RX ORDER — ROSUVASTATIN CALCIUM 40 MG/1
40 TABLET, COATED ORAL NIGHTLY
Status: DISCONTINUED | OUTPATIENT
Start: 2024-04-15 | End: 2024-04-16

## 2024-04-15 RX ORDER — ASPIRIN 81 MG/1
81 TABLET, CHEWABLE ORAL DAILY
Status: DISCONTINUED | OUTPATIENT
Start: 2024-04-15 | End: 2024-04-16

## 2024-04-15 RX ORDER — POLYETHYLENE GLYCOL 3350 17 G/17G
17 POWDER, FOR SOLUTION ORAL DAILY PRN
Status: DISCONTINUED | OUTPATIENT
Start: 2024-04-15 | End: 2024-04-17 | Stop reason: HOSPADM

## 2024-04-15 RX ORDER — ALBUTEROL SULFATE 90 UG/1
2 AEROSOL, METERED RESPIRATORY (INHALATION) EVERY 4 HOURS PRN
COMMUNITY

## 2024-04-15 RX ORDER — PANTOPRAZOLE SODIUM 40 MG/1
40 TABLET, DELAYED RELEASE ORAL DAILY
COMMUNITY

## 2024-04-15 RX ORDER — DIPHENHYDRAMINE HYDROCHLORIDE 50 MG/ML
50 INJECTION INTRAMUSCULAR; INTRAVENOUS EVERY 12 HOURS PRN
COMMUNITY

## 2024-04-15 RX ORDER — DEXTROSE MONOHYDRATE 100 MG/ML
INJECTION, SOLUTION INTRAVENOUS CONTINUOUS PRN
Status: DISCONTINUED | OUTPATIENT
Start: 2024-04-15 | End: 2024-04-17 | Stop reason: HOSPADM

## 2024-04-15 RX ORDER — VENLAFAXINE HYDROCHLORIDE 37.5 MG/1
75 CAPSULE, EXTENDED RELEASE ORAL DAILY
Status: DISCONTINUED | OUTPATIENT
Start: 2024-04-16 | End: 2024-04-17 | Stop reason: HOSPADM

## 2024-04-15 RX ORDER — VENLAFAXINE HYDROCHLORIDE 75 MG/1
75 CAPSULE, EXTENDED RELEASE ORAL DAILY
COMMUNITY

## 2024-04-15 RX ORDER — LORATADINE 10 MG/1
20 TABLET ORAL DAILY
COMMUNITY

## 2024-04-15 RX ORDER — DIPHENHYDRAMINE HYDROCHLORIDE 50 MG/ML
12.5 INJECTION INTRAMUSCULAR; INTRAVENOUS ONCE
Status: COMPLETED | OUTPATIENT
Start: 2024-04-15 | End: 2024-04-15

## 2024-04-15 RX ORDER — ENOXAPARIN SODIUM 100 MG/ML
40 INJECTION SUBCUTANEOUS DAILY
Status: DISCONTINUED | OUTPATIENT
Start: 2024-04-16 | End: 2024-04-17 | Stop reason: HOSPADM

## 2024-04-15 RX ORDER — METOPROLOL SUCCINATE 25 MG/1
25 TABLET, EXTENDED RELEASE ORAL
COMMUNITY

## 2024-04-15 RX ORDER — ONDANSETRON 2 MG/ML
4 INJECTION INTRAMUSCULAR; INTRAVENOUS EVERY 6 HOURS PRN
Status: DISCONTINUED | OUTPATIENT
Start: 2024-04-15 | End: 2024-04-17 | Stop reason: HOSPADM

## 2024-04-15 RX ORDER — SUVOREXANT 20 MG/1
1 TABLET, FILM COATED ORAL
COMMUNITY

## 2024-04-15 RX ORDER — PROCHLORPERAZINE MALEATE 10 MG
TABLET ORAL
COMMUNITY

## 2024-04-15 RX ORDER — LEVETIRACETAM 250 MG/1
250 TABLET ORAL EVERY 12 HOURS PRN
COMMUNITY

## 2024-04-15 RX ORDER — ACETAMINOPHEN 325 MG/1
650 TABLET ORAL
Status: COMPLETED | OUTPATIENT
Start: 2024-04-15 | End: 2024-04-15

## 2024-04-15 RX ORDER — MAGNESIUM GLYCINATE 100 MG
200 CAPSULE ORAL 2 TIMES DAILY
COMMUNITY

## 2024-04-15 RX ORDER — PROCHLORPERAZINE EDISYLATE 5 MG/ML
10 INJECTION INTRAMUSCULAR; INTRAVENOUS ONCE
Status: COMPLETED | OUTPATIENT
Start: 2024-04-15 | End: 2024-04-15

## 2024-04-15 RX ORDER — 0.9 % SODIUM CHLORIDE 0.9 %
1000 INTRAVENOUS SOLUTION INTRAVENOUS ONCE
Status: COMPLETED | OUTPATIENT
Start: 2024-04-15 | End: 2024-04-15

## 2024-04-15 RX ADMIN — DIPHENHYDRAMINE HYDROCHLORIDE 12.5 MG: 50 INJECTION, SOLUTION INTRAMUSCULAR; INTRAVENOUS at 12:58

## 2024-04-15 RX ADMIN — PROCHLORPERAZINE EDISYLATE 10 MG: 5 INJECTION INTRAMUSCULAR; INTRAVENOUS at 12:59

## 2024-04-15 RX ADMIN — METOPROLOL SUCCINATE 25 MG: 25 TABLET, EXTENDED RELEASE ORAL at 23:22

## 2024-04-15 RX ADMIN — ASPIRIN 81 MG CHEWABLE TABLET 81 MG: 81 TABLET CHEWABLE at 12:58

## 2024-04-15 RX ADMIN — SODIUM CHLORIDE 1000 ML: 9 INJECTION, SOLUTION INTRAVENOUS at 13:16

## 2024-04-15 RX ADMIN — METHOCARBAMOL 500 MG: 500 TABLET ORAL at 23:22

## 2024-04-15 RX ADMIN — IOPAMIDOL 40 ML: 755 INJECTION, SOLUTION INTRAVENOUS at 12:32

## 2024-04-15 RX ADMIN — CLOPIDOGREL BISULFATE 75 MG: 75 TABLET, FILM COATED ORAL at 12:58

## 2024-04-15 RX ADMIN — ACETAMINOPHEN 650 MG: 325 TABLET ORAL at 12:58

## 2024-04-15 RX ADMIN — SODIUM CHLORIDE, PRESERVATIVE FREE 10 ML: 5 INJECTION INTRAVENOUS at 21:59

## 2024-04-15 RX ADMIN — ACETAMINOPHEN 650 MG: 325 TABLET ORAL at 22:36

## 2024-04-15 RX ADMIN — GADOTERIDOL 17 ML: 279.3 INJECTION, SOLUTION INTRAVENOUS at 14:44

## 2024-04-15 RX ADMIN — ROSUVASTATIN 40 MG: 40 TABLET, FILM COATED ORAL at 21:59

## 2024-04-15 RX ADMIN — IOPAMIDOL 80 ML: 755 INJECTION, SOLUTION INTRAVENOUS at 12:36

## 2024-04-15 RX ADMIN — DEXAMETHASONE SODIUM PHOSPHATE 4 MG: 4 INJECTION INTRA-ARTICULAR; INTRALESIONAL; INTRAMUSCULAR; INTRAVENOUS; SOFT TISSUE at 12:59

## 2024-04-15 ASSESSMENT — PAIN - FUNCTIONAL ASSESSMENT
PAIN_FUNCTIONAL_ASSESSMENT: 0-10
PAIN_FUNCTIONAL_ASSESSMENT: ACTIVITIES ARE NOT PREVENTED
PAIN_FUNCTIONAL_ASSESSMENT: ACTIVITIES ARE NOT PREVENTED
PAIN_FUNCTIONAL_ASSESSMENT: PREVENTS OR INTERFERES SOME ACTIVE ACTIVITIES AND ADLS

## 2024-04-15 ASSESSMENT — PAIN DESCRIPTION - ORIENTATION
ORIENTATION: RIGHT
ORIENTATION: ANTERIOR;POSTERIOR
ORIENTATION: MID

## 2024-04-15 ASSESSMENT — PAIN DESCRIPTION - LOCATION
LOCATION: HEAD

## 2024-04-15 ASSESSMENT — PAIN SCALES - GENERAL
PAINLEVEL_OUTOF10: 9
PAINLEVEL_OUTOF10: 9
PAINLEVEL_OUTOF10: 7
PAINLEVEL_OUTOF10: 9
PAINLEVEL_OUTOF10: 9

## 2024-04-15 ASSESSMENT — PAIN DESCRIPTION - DESCRIPTORS
DESCRIPTORS: THROBBING
DESCRIPTORS: ACHING
DESCRIPTORS: THROBBING

## 2024-04-15 ASSESSMENT — PAIN DESCRIPTION - ONSET: ONSET: ON-GOING

## 2024-04-15 ASSESSMENT — PAIN DESCRIPTION - FREQUENCY: FREQUENCY: CONTINUOUS

## 2024-04-15 ASSESSMENT — PAIN DESCRIPTION - PAIN TYPE: TYPE: ACUTE PAIN

## 2024-04-15 NOTE — ED TRIAGE NOTES
Pt reports she has a hx of hemiplegic migraines. Pt reports HA x1 month, difficulty speaking x2 weeks and R sided facial droop starting at 9p last night. Pt denies CP and SOB.

## 2024-04-15 NOTE — H&P
History and Physical    Date of Service:  4/15/2024  Primary Care Provider: No primary care provider on file.  Source of information: The patient and Chart review    Chief Complaint: Headache, Facial Droop, and Aphasia      History of Presenting Illness:   Katja Rosado is a 43 y.o. female with visiting here from [facility] significant for complex migraine, anxiety, type 2 diabetes presented to emergency room with seizure-like send times which include right facial droop, dysarthria and right-sided weakness onset around 9 PM on 4/14/2024.  Patient gives history of hemiplegic migraines associated with trouble speaking and slurred speech.  She developed facial drop right-sided weakness so she came to the ED for further evaluation.  Patient is out of state therefore no previous records available.  Patient was emergently evaluated for stroke.  A noncontrast head CT and CT perfusion were negative.  Teleneurologist evaluated, no notes of blood but was communicated by EDMD that they recommended aspirin, Plavix and admission for further workup.  At the time of my evaluation, CTA head and neck report is pending.    The patient denies any headache, blurry vision, sore throat, trouble swallowing, trouble with speech, chest pain, SOB, cough, fever, chills, N/V/D, abd pain, urinary symptoms, constipation, recent travels, sick contacts, focal or generalized neurological symptoms, falls, injuries, rashes, contact with COVID-19 diagnosed patients, hematemesis, melena, hemoptysis, hematuria, rashes, denies starting any new medications and denies any other concerns or problems besides as mentioned above.         REVIEW OF SYSTEMS:  A comprehensive review of systems was negative except for that written in the History of Present Illness.     No past medical history on file.   No past surgical history on file.  Prior to Admission medications    Not on File     No Known Allergies   No family history on file.   Social History:

## 2024-04-15 NOTE — ED NOTES
TRANSFER - OUT REPORT:    Verbal report given to DORY Plaza on Katja Rosado  being transferred to  for routine progression of patient care       Report consisted of patient's Situation, Background, Assessment and   Recommendations(SBAR).     Information from the following report(s) Nurse Handoff Report, Index, ED Encounter Summary, ED SBAR, Adult Overview, Intake/Output, MAR, and Recent Results was reviewed with the receiving nurse.    [place] Fall Assessment:    Presents to emergency department  because of falls (Syncope, seizure, or loss of consciousness): No  Age > 70: No  Altered Mental Status, Intoxication with alcohol or substance confusion (Disorientation, impaired judgment, poor safety awaremess, or inability to follow instructions): No  Impaired Mobility: Ambulates or transfers with assistive devices or assistance; Unable to ambulate or transer.: No  Nursing Judgement: No          Lines:   Peripheral IV 04/15/24 Distal;Right;Anterior Cephalic (Active)   Line Status Blood return noted;Normal saline locked;Flushed 04/15/24 1235   Phlebitis Assessment No symptoms 04/15/24 1235   Infiltration Assessment 0 04/15/24 1235   Dressing Status New dressing applied 04/15/24 1235   Dressing Type Transparent 04/15/24 1235   Dressing Intervention New 04/15/24 1235        Opportunity for questions and clarification was provided.      Patient transported with:  Tech

## 2024-04-15 NOTE — PROGRESS NOTES
Pt off the floor for imaging at this time. Pt will be evaluated as able.    Machelel Morrison, SHAMEKAT, PT

## 2024-04-15 NOTE — PROGRESS NOTES
Neurocritical Care Code Stroke Documentation      Symptoms:   Right-sided posterior headache, right-sided weakness, right-sided numbness/tingling, right facial droop, slurred speech and aphasia. She also reports blurry vision.    Last Known Well: Unclear, reported son noticed her right sided facial droop around  last night    Medical hx: Diabetes, complex migraines with right-sided hemiparesis per patient    Anticoagulation: None    VAN:   Positive   NIHSS:   1a-LOC:0    1b-Month/Age:0    1c-Open/Close Hand:0    2-Best Gaze:1    3-Visual Fields:0    4-Facial Palsy:2    5a-Left Arm:0    5b-Right Arm:1    6a-Left Le    6b-Right Le    7-Limb Ataxia:0    8-Sensory:1    9-Best Language:1    10-Dysarthria:1    11-Extinction/Inattention:0  TOTAL SCORE:9   Imaging:   CT: No acute abnormality.     CTA/CTP: IMPRESSION:  No acute large vessel arterial occlusion or cerebral perfusion abnormality. No evidence for intracranial aneurysm. Micronodular opacities in the right upper lobe of the lung likely infectious/inflammatory.   Plan:   TNK Candidate: NO    Mechanical thrombectomy Candidate: NO   Alerted Dr. Shah on call that patient is VAN positive.   Given history, possible complicated migraine, but will need to be admitted to rule out CVA.    Discussed with Dr. Richardson.     Arrival time: 1219  Time spent: 30 minutes.     Discussed imaging results with admitting Hospitalist Dr. Lovelace.    ELVIN Samuel - NP

## 2024-04-15 NOTE — ED NOTES
12:05 PM  I have evaluated the patient as the Provider in Rapid Medical Evaluation (RME). I have reviewed her vital signs and the triage nurse assessment. I have talked with the patient and any available family and advised that I am the provider in triage and have ordered the appropriate study to initiate their work up based on the clinical presentation during my assessment. I have advised that the patient will be accommodated in the Main ED as soon as possible. I have also requested to contact the triage nurse or myself immediately if the patient experiences any changes in their condition during this brief waiting period.    43 y.o. female presents to ED with concern for facial droop and dysarhtria. Patient is visiting the area from [place], and reports that for the last month she has been having hemiplegic migraines with associated trouble speaking and slurred speech.  She reports last night she  had a facial droop and R sided weakness.  She reports that this has happened occasionally with her migraines.  We have no records of this in her chart as she is from [place].  Notes an associated headache.  Dr. Richardson in triage to evaluate, code stroke called.    BRETT NOLASCO Ashley, PA  04/15/24 [id number]

## 2024-04-15 NOTE — ED PROVIDER NOTES
[facility] EMERGENCY DEP  EMERGENCY DEPARTMENT ENCOUNTER      Pt Name: Katja Rosado  MRN: [id number]  Birthdate 1980  Date of evaluation: 4/15/2024  Provider: Ananya Richardson MD    CHIEF COMPLAINT       Chief Complaint   Patient presents with    Headache    Facial Droop    Aphasia         HISTORY OF PRESENT ILLNESS   (Location/Symptom, Timing/Onset, Context/Setting, Quality, Duration, Modifying Factors, Severity)  Note limiting factors.   HPI  43-year-old female with a history of DM, obesity, migraines presenting for evaluation of headache.  Patient reports that she has a history of hemiplegic migraines but is from [place] where she receives her neurology care, states that she has had a headache ongoing for a month, states that she has been having more trouble with speaking for 2 weeks.  Reports that last night around 9 PM she developed a right-sided facial droop.  Also reports that she is having right lower extremity numbness and tingling and difficulty with walking as well as weakness in her right upper extremity.    Review of External Medical Records:         Nursing Notes were reviewed.      REVIEW OF SYSTEMS    (2-9 systems for level 4, 10 or more for level 5)     Except as noted above the remainder of the review of systems was reviewed and negative.       PAST MEDICAL HISTORY   No past medical history on file.      SURGICAL HISTORY     No past surgical history on file.      CURRENT MEDICATIONS       Previous Medications    No medications on file       ALLERGIES     Patient has no known allergies.    FAMILY HISTORY     No family history on file.       SOCIAL HISTORY              PHYSICAL EXAM    (up to 7 for level 4, 8 or more for level 5)     ED Triage Vitals [04/15/24 1210]   BP Temp Temp Source Pulse Respirations SpO2 Height Weight   118/70 98 °F (36.7 °C) Tympanic 94 18 97 % -- --       There is no height or weight on file to calculate BMI.    Physical Exam  Vitals and nursing note reviewed.

## 2024-04-15 NOTE — PROGRESS NOTES
CT angiogram of the head and neck negative for large vessel occlusion or any other acute processes.  There is incidental report of micronodular opacities in the right upper lobe.  Patient is asymptomatic, afebrile.  Check chest x-ray.

## 2024-04-15 NOTE — PROGRESS NOTES
SLP CONTACT NOTE:    Consult received, chart reviewed, patient off the floor for MRI when attempted. ST will continue to follow as indicated.    Thank you  Jannette Estrada M.Ed, CCC-SLP  Speech Language Pathologist

## 2024-04-16 ENCOUNTER — APPOINTMENT (OUTPATIENT)
Facility: HOSPITAL | Age: 44
DRG: 103 | End: 2024-04-16
Payer: COMMERCIAL

## 2024-04-16 PROBLEM — R29.90 STROKE-LIKE EPISODE: Status: ACTIVE | Noted: 2024-04-16

## 2024-04-16 LAB
CHOLEST SERPL-MCNC: 168 MG/DL
ERYTHROCYTE [DISTWIDTH] IN BLOOD BY AUTOMATED COUNT: 13.1 % (ref 11.5–14.5)
EST. AVERAGE GLUCOSE BLD GHB EST-MCNC: 137 MG/DL
GLUCOSE BLD STRIP.AUTO-MCNC: 122 MG/DL (ref 65–117)
GLUCOSE BLD STRIP.AUTO-MCNC: 130 MG/DL (ref 65–117)
GLUCOSE BLD STRIP.AUTO-MCNC: 164 MG/DL (ref 65–117)
GLUCOSE BLD STRIP.AUTO-MCNC: 188 MG/DL (ref 65–117)
HBA1C MFR BLD: 6.4 % (ref 4–5.6)
HCT VFR BLD AUTO: 38.9 % (ref 35–47)
HDLC SERPL-MCNC: 53 MG/DL
HDLC SERPL: 3.2 (ref 0–5)
HGB BLD-MCNC: 13.7 G/DL (ref 11.5–16)
LDLC SERPL CALC-MCNC: 104.4 MG/DL (ref 0–100)
MCH RBC QN AUTO: 32.2 PG (ref 26–34)
MCHC RBC AUTO-ENTMCNC: 35.2 G/DL (ref 30–36.5)
MCV RBC AUTO: 91.3 FL (ref 80–99)
NRBC # BLD: 0 K/UL (ref 0–0.01)
NRBC BLD-RTO: 0 PER 100 WBC
PLATELET # BLD AUTO: 325 K/UL (ref 150–400)
PMV BLD AUTO: 10.4 FL (ref 8.9–12.9)
RBC # BLD AUTO: 4.26 M/UL (ref 3.8–5.2)
SERVICE CMNT-IMP: ABNORMAL
TRIGL SERPL-MCNC: 53 MG/DL
VLDLC SERPL CALC-MCNC: 10.6 MG/DL
WBC # BLD AUTO: 11.1 K/UL (ref 3.6–11)

## 2024-04-16 PROCEDURE — 2500000003 HC RX 250 WO HCPCS: Performed by: NURSE PRACTITIONER

## 2024-04-16 PROCEDURE — 6360000002 HC RX W HCPCS: Performed by: NURSE PRACTITIONER

## 2024-04-16 PROCEDURE — 80061 LIPID PANEL: CPT

## 2024-04-16 PROCEDURE — 92523 SPEECH SOUND LANG COMPREHEN: CPT

## 2024-04-16 PROCEDURE — 2580000003 HC RX 258: Performed by: NURSE PRACTITIONER

## 2024-04-16 PROCEDURE — 97161 PT EVAL LOW COMPLEX 20 MIN: CPT

## 2024-04-16 PROCEDURE — G0378 HOSPITAL OBSERVATION PER HR: HCPCS

## 2024-04-16 PROCEDURE — 97116 GAIT TRAINING THERAPY: CPT

## 2024-04-16 PROCEDURE — 71250 CT THORAX DX C-: CPT

## 2024-04-16 PROCEDURE — 1100000000 HC RM PRIVATE

## 2024-04-16 PROCEDURE — 83036 HEMOGLOBIN GLYCOSYLATED A1C: CPT

## 2024-04-16 PROCEDURE — 2580000003 HC RX 258: Performed by: HOSPITALIST

## 2024-04-16 PROCEDURE — 85027 COMPLETE CBC AUTOMATED: CPT

## 2024-04-16 PROCEDURE — 6370000000 HC RX 637 (ALT 250 FOR IP): Performed by: NURSE PRACTITIONER

## 2024-04-16 PROCEDURE — 76770 US EXAM ABDO BACK WALL COMP: CPT

## 2024-04-16 PROCEDURE — 6360000002 HC RX W HCPCS: Performed by: HOSPITALIST

## 2024-04-16 PROCEDURE — 36415 COLL VENOUS BLD VENIPUNCTURE: CPT

## 2024-04-16 PROCEDURE — 97165 OT EVAL LOW COMPLEX 30 MIN: CPT

## 2024-04-16 PROCEDURE — 6370000000 HC RX 637 (ALT 250 FOR IP): Performed by: HOSPITALIST

## 2024-04-16 PROCEDURE — 92610 EVALUATE SWALLOWING FUNCTION: CPT

## 2024-04-16 PROCEDURE — 82962 GLUCOSE BLOOD TEST: CPT

## 2024-04-16 PROCEDURE — 97535 SELF CARE MNGMENT TRAINING: CPT

## 2024-04-16 PROCEDURE — 99223 1ST HOSP IP/OBS HIGH 75: CPT | Performed by: NURSE PRACTITIONER

## 2024-04-16 PROCEDURE — 6370000000 HC RX 637 (ALT 250 FOR IP): Performed by: STUDENT IN AN ORGANIZED HEALTH CARE EDUCATION/TRAINING PROGRAM

## 2024-04-16 RX ORDER — ACETAMINOPHEN 500 MG
1000 TABLET ORAL EVERY 8 HOURS SCHEDULED
Status: DISCONTINUED | OUTPATIENT
Start: 2024-04-16 | End: 2024-04-17 | Stop reason: HOSPADM

## 2024-04-16 RX ORDER — METOCLOPRAMIDE HYDROCHLORIDE 5 MG/ML
5 INJECTION INTRAMUSCULAR; INTRAVENOUS EVERY 8 HOURS
Status: DISCONTINUED | OUTPATIENT
Start: 2024-04-16 | End: 2024-04-17 | Stop reason: HOSPADM

## 2024-04-16 RX ORDER — DEXAMETHASONE SODIUM PHOSPHATE 4 MG/ML
4 INJECTION, SOLUTION INTRA-ARTICULAR; INTRALESIONAL; INTRAMUSCULAR; INTRAVENOUS; SOFT TISSUE ONCE
Status: COMPLETED | OUTPATIENT
Start: 2024-04-16 | End: 2024-04-16

## 2024-04-16 RX ORDER — KETOROLAC TROMETHAMINE 30 MG/ML
15 INJECTION, SOLUTION INTRAMUSCULAR; INTRAVENOUS EVERY 6 HOURS PRN
Status: DISCONTINUED | OUTPATIENT
Start: 2024-04-16 | End: 2024-04-17 | Stop reason: HOSPADM

## 2024-04-16 RX ORDER — SODIUM CHLORIDE 9 MG/ML
INJECTION, SOLUTION INTRAVENOUS CONTINUOUS
Status: DISCONTINUED | OUTPATIENT
Start: 2024-04-16 | End: 2024-04-17 | Stop reason: HOSPADM

## 2024-04-16 RX ORDER — MAGNESIUM SULFATE IN WATER 40 MG/ML
2000 INJECTION, SOLUTION INTRAVENOUS ONCE
Status: COMPLETED | OUTPATIENT
Start: 2024-04-16 | End: 2024-04-16

## 2024-04-16 RX ORDER — LANOLIN ALCOHOL/MO/W.PET/CERES
400 CREAM (GRAM) TOPICAL 2 TIMES DAILY
Status: DISCONTINUED | OUTPATIENT
Start: 2024-04-16 | End: 2024-04-17 | Stop reason: HOSPADM

## 2024-04-16 RX ADMIN — METHOCARBAMOL 500 MG: 500 TABLET ORAL at 20:18

## 2024-04-16 RX ADMIN — DEXAMETHASONE SODIUM PHOSPHATE 4 MG: 4 INJECTION, SOLUTION INTRAMUSCULAR; INTRAVENOUS at 10:47

## 2024-04-16 RX ADMIN — VALPROATE SODIUM 1000 MG: 100 INJECTION, SOLUTION INTRAVENOUS at 16:40

## 2024-04-16 RX ADMIN — METOPROLOL SUCCINATE 25 MG: 25 TABLET, EXTENDED RELEASE ORAL at 20:17

## 2024-04-16 RX ADMIN — MAGNESIUM SULFATE HEPTAHYDRATE 2000 MG: 40 INJECTION, SOLUTION INTRAVENOUS at 10:56

## 2024-04-16 RX ADMIN — METOCLOPRAMIDE 5 MG: 5 INJECTION, SOLUTION INTRAMUSCULAR; INTRAVENOUS at 10:44

## 2024-04-16 RX ADMIN — KETOROLAC TROMETHAMINE 15 MG: 30 INJECTION, SOLUTION INTRAMUSCULAR; INTRAVENOUS at 20:27

## 2024-04-16 RX ADMIN — MAGNESIUM OXIDE TAB 400 MG (241.3 MG ELEMENTAL MG) 400 MG: 400 (241.3 MG) TAB at 20:18

## 2024-04-16 RX ADMIN — ACETAMINOPHEN 1000 MG: 500 TABLET ORAL at 20:18

## 2024-04-16 RX ADMIN — ASPIRIN 81 MG CHEWABLE TABLET 81 MG: 81 TABLET CHEWABLE at 09:22

## 2024-04-16 RX ADMIN — VENLAFAXINE HYDROCHLORIDE 75 MG: 37.5 CAPSULE, EXTENDED RELEASE ORAL at 09:22

## 2024-04-16 RX ADMIN — MONTELUKAST 10 MG: 10 TABLET, FILM COATED ORAL at 09:22

## 2024-04-16 RX ADMIN — SODIUM CHLORIDE, PRESERVATIVE FREE 10 ML: 5 INJECTION INTRAVENOUS at 20:18

## 2024-04-16 RX ADMIN — SODIUM CHLORIDE, PRESERVATIVE FREE 10 ML: 5 INJECTION INTRAVENOUS at 09:23

## 2024-04-16 RX ADMIN — SODIUM CHLORIDE: 9 INJECTION, SOLUTION INTRAVENOUS at 16:38

## 2024-04-16 RX ADMIN — ACETAMINOPHEN 1000 MG: 500 TABLET ORAL at 13:46

## 2024-04-16 RX ADMIN — METOCLOPRAMIDE 5 MG: 5 INJECTION, SOLUTION INTRAMUSCULAR; INTRAVENOUS at 19:05

## 2024-04-16 ASSESSMENT — PAIN - FUNCTIONAL ASSESSMENT
PAIN_FUNCTIONAL_ASSESSMENT: ACTIVITIES ARE NOT PREVENTED
PAIN_FUNCTIONAL_ASSESSMENT: ACTIVITIES ARE NOT PREVENTED

## 2024-04-16 ASSESSMENT — PAIN DESCRIPTION - LOCATION
LOCATION: HEAD

## 2024-04-16 ASSESSMENT — PAIN DESCRIPTION - DESCRIPTORS
DESCRIPTORS: JABBING;STABBING;THROBBING
DESCRIPTORS: THROBBING

## 2024-04-16 ASSESSMENT — PAIN DESCRIPTION - ORIENTATION
ORIENTATION: POSTERIOR;ANTERIOR
ORIENTATION: ANTERIOR
ORIENTATION: ANTERIOR;POSTERIOR

## 2024-04-16 ASSESSMENT — PAIN SCALES - GENERAL
PAINLEVEL_OUTOF10: 9
PAINLEVEL_OUTOF10: 8
PAINLEVEL_OUTOF10: 8

## 2024-04-16 ASSESSMENT — PAIN DESCRIPTION - PAIN TYPE: TYPE: ACUTE PAIN

## 2024-04-16 NOTE — CONSULTS
NEUROLOGY CONSULT NOTE    Name Katja Rosado Age 43 y.o.   MRN [id number]  1980     Consulting Physician: Amandeep Prince MD      Chief Complaint:  R facial droop, R hemiparesis     Assessment:     Principal Problem:    Stroke-like symptom  Resolved Problems:    * No resolved hospital problems. *      Patient is a 43 year-old female with history of intractable migraine headaches, DM type II, HLD, and anxiety who presents yesterday with intractable complicated migraine. Patient has a history of R-sided and R face hemiparesis/numbness, R facial droop and expressive aphasia related to migraines. She has been hospitalized several times including going to [facility] in [place] and most recently admitted in [place] in December for same symptoms as above but the R-hemiparesis/numbness has not improved and remained constant since that time. Over the past couple days, her facial weakness and speech changes began again. She is on a complex regimen of migraine medications with no changes since recent hospitalization in December. She has already received Decadron 4mg IV, Mag 1 g IV, Reglan and fluid bolus here with no improvement of symptoms.   MRI brain w wo contrast negative.     Intractable complicated migraine  Recommendations:   - Give Depakote 1000 mg IV once  - Patient can take home medication/non-formulary Qulipta if she is able to bring it and approved by pharmacy  - Do not want to change any baseline migraine medications inpatient. She is best served following-up with her primary Neurologist or establishing care in [facility] clinic.  - If continues to not improve, can consider transfer to facility that does DHE protocol   - Start magnesium 400 mg daily  - Avoid dehydration    History of Present Illness:      This is a 43 y.o. female with history of intractable migraine headaches, DM type II, HLD, and anxiety who presents yesterday with recommendation from her primary Neurologist to have inpatient

## 2024-04-16 NOTE — PROGRESS NOTES
[facility]                                                                                          Hospitalist Progress Note  ELVIN Juarez NP  Office Phone: [phone]        Date of Service:  2024  NAME:  Katja Rosado  :  1980  MRN:  [id number]       Admission Summary:   Katja Rosado is a 43 y.o. female with visiting here from [facility] significant for complex migraine, anxiety, type 2 diabetes presented to emergency room with seizure-like send times which include right facial droop, dysarthria and right-sided weakness onset around 9 PM on 2024.  Patient gives history of hemiplegic migraines associated with trouble speaking and slurred speech.  She developed facial drop right-sided weakness so she came to the ED for further evaluation.  Patient is out of state therefore no previous records available.  Patient was emergently evaluated for stroke.  A noncontrast head CT and CT perfusion were negative.  Teleneurologist evaluated, no notes of blood but was communicated by EDMD that they recommended aspirin, Plavix and admission for further workup.  At the time of my evaluation, CTA head and neck report is pending.     The patient denies any headache, blurry vision, sore throat, trouble swallowing, trouble with speech, chest pain, SOB, cough, fever, chills, N/V/D, abd pain, urinary symptoms, constipation, recent travels, sick contacts, focal or generalized neurological symptoms, falls, injuries, rashes, contact with COVID-19 diagnosed patients, hematemesis, melena, hemoptysis, hematuria, rashes, denies starting any new medications and denies any other concerns or problems besides as mentioned above.    Interval history / Subjective:      Pt still with migraine 7/10, pt endorses this migraine has been ongoing since December when she was hospitalized. Headache starts on right occipital area and radiates to frontal area.

## 2024-04-16 NOTE — PROGRESS NOTES
Right upper lung micronodular opacities on the incidentally imaged upper chest on CTA H&N could be infectiosus or inflammatory.CXR normal not sensitive test to exclude wide spread pulmonary disease.Obtain dedicated dry chest CT.

## 2024-04-17 VITALS
SYSTOLIC BLOOD PRESSURE: 104 MMHG | DIASTOLIC BLOOD PRESSURE: 59 MMHG | RESPIRATION RATE: 18 BRPM | HEART RATE: 82 BPM | TEMPERATURE: 98.4 F | OXYGEN SATURATION: 97 %

## 2024-04-17 PROBLEM — R29.90 STROKE-LIKE EPISODE: Status: RESOLVED | Noted: 2024-04-16 | Resolved: 2024-04-17

## 2024-04-17 PROBLEM — R29.90 STROKE-LIKE SYMPTOM: Status: RESOLVED | Noted: 2024-04-15 | Resolved: 2024-04-17

## 2024-04-17 LAB
GLUCOSE BLD STRIP.AUTO-MCNC: 115 MG/DL (ref 65–117)
SERVICE CMNT-IMP: NORMAL

## 2024-04-17 PROCEDURE — 82962 GLUCOSE BLOOD TEST: CPT

## 2024-04-17 PROCEDURE — 6360000002 HC RX W HCPCS: Performed by: NURSE PRACTITIONER

## 2024-04-17 PROCEDURE — 6370000000 HC RX 637 (ALT 250 FOR IP): Performed by: NURSE PRACTITIONER

## 2024-04-17 PROCEDURE — 2580000003 HC RX 258: Performed by: NURSE PRACTITIONER

## 2024-04-17 PROCEDURE — 2580000003 HC RX 258: Performed by: HOSPITALIST

## 2024-04-17 PROCEDURE — 6370000000 HC RX 637 (ALT 250 FOR IP): Performed by: HOSPITALIST

## 2024-04-17 RX ADMIN — SODIUM CHLORIDE: 9 INJECTION, SOLUTION INTRAVENOUS at 00:50

## 2024-04-17 RX ADMIN — SODIUM CHLORIDE, PRESERVATIVE FREE 10 ML: 5 INJECTION INTRAVENOUS at 09:55

## 2024-04-17 RX ADMIN — MONTELUKAST 10 MG: 10 TABLET, FILM COATED ORAL at 09:55

## 2024-04-17 RX ADMIN — VENLAFAXINE HYDROCHLORIDE 75 MG: 37.5 CAPSULE, EXTENDED RELEASE ORAL at 09:55

## 2024-04-17 RX ADMIN — ACETAMINOPHEN 1000 MG: 500 TABLET ORAL at 06:48

## 2024-04-17 RX ADMIN — MAGNESIUM OXIDE TAB 400 MG (241.3 MG ELEMENTAL MG) 400 MG: 400 (241.3 MG) TAB at 09:55

## 2024-04-17 RX ADMIN — METOCLOPRAMIDE 5 MG: 5 INJECTION, SOLUTION INTRAMUSCULAR; INTRAVENOUS at 06:48

## 2024-04-17 NOTE — DISCHARGE INSTRUCTIONS
Discharge Instructions       PATIENT ID: Katja Rosado  MRN: [id number]   YOB: 1980    DATE OF ADMISSION: 4/15/2024   DATE OF DISCHARGE: 4/17/2024    PRIMARY CARE PROVIDER: No primary care provider on file.     ATTENDING PHYSICIAN: Amandeep Prince MD   DISCHARGING PROVIDER: ELVIN Juarez NP    To contact this individual call [phone] and ask the  to page.   If unavailable ask to be transferred the Adult Hospitalist Department.    DISCHARGE DIAGNOSES: Complex Migraine    CONSULTATIONS: Neurology    PROCEDURES/SURGERIES: * No surgery found *    PENDING TEST RESULTS:   At the time of discharge the following test results are still pending: none    FOLLOW UP APPOINTMENTS:    Neurologist    ADDITIONAL CARE RECOMMENDATIONS:   Continue all your home medications  Follow up with your neurologist    DIET: Regular    ACTIVITY: activity as tolerated    WOUND CARE: none    EQUIPMENT needed: none    DISCHARGE MEDICATIONS:   See Medication Reconciliation Form    It is important that you take the medication exactly as they are prescribed.   Keep your medication in the bottles provided by the pharmacist and keep a list of the medication names, dosages, and times to be taken in your wallet.   Do not take other medications without consulting your doctor.       NOTIFY YOUR PHYSICIAN FOR ANY OF THE FOLLOWING:   Fever over 101 degrees for 24 hours.   Chest pain, shortness of breath, fever, chills, nausea, vomiting, diarrhea, change in mentation, falling, weakness, bleeding. Severe pain or pain not relieved by medications.  Or, any other signs or symptoms that you may have questions about.      DISPOSITION:   x Home With:   OT  PT  HH  RN       SNF/Inpatient Rehab/LTAC    Independent/assisted living    Hospice    Other:     CDMP Checked:   Yes x     PROBLEM LIST Updated:  Yes x       Signed:   ELVIN Juarez NP  4/17/2024  10:03 AM

## 2024-04-17 NOTE — PROGRESS NOTES
4/17/2024        RE: Katja Rosado         [address]  [place]          To Whom It May Concern,      Due to medical reasons, Katja Rosado was hospitalized from 4/15-4/17. She may return to work.        Sincerely,          ELVIN Juarez - NP

## 2024-04-17 NOTE — PROGRESS NOTES
Patient discharging, PIV removed, review of discharge paperwork and discussion of resuming care with her providers in [place] and [place].   Driving herself home.      Stroke Education provided to patient and the following topics were discussed    1. Patients personal risk factors for stroke are hypertension    2. Warning signs of Stroke:        * Sudden numbness or weakness of the face, arm or leg, especially on one side of          The body            * Sudden confusion, trouble speaking or understanding        * Sudden trouble seeing in one or both eyes        * Sudden trouble walking, dizziness, loss of balance or coordination        * Sudden severe headache with no known cause      3. Importance of activation Emergency Medical Services ( 9-1-1 ) immediately if experience any warning signs of stroke.    4. Be sure and schedule a follow-up appointment with your primary care doctor or any specialists as instructed.     5. You must take medicine every day to treat your risk factors for stroke.  Be sure to take your medicines exactly as your doctor tells you: no more, no less.  Know what your medicines are for , what they do.  Anti-thrombotics /anticoagulants can help prevent strokes.  You are taking the following medicine(s)  Metoprolol     6.  Smoking and second-hand smoke greatly increase your risk of stroke, cardiovascular disease and death. Smoking history never    7. Information provided was Stroke Handouts    8. Documentation of teaching completed in Patient Education Activity and on Care Plan with teaching response noted?  yes

## 2024-04-17 NOTE — DISCHARGE SUMMARY
besides as mentioned above.      4/17/2024  VSS  Migraine improved  Stable for d/c    DISCHARGE DIAGNOSES / PLAN:      Complex migraine  Stroke ruled out  Hx of chronic right hemiplegic migraine  - MRI brain negative, CTA H/N no LVO  - .4, was started on lipitor on admission, can try diet first to bring this value down, discussed with patient  - does not need asa or plavix, both have been stopped  - pt on multiple medications for migraine prevention and abortive therapy  - neurology consulted for their expertise     Type II DM  - A1c 6.4  - hold metformin, ssi     Hyperlipidemia - c/w Lipitor     Anxiety - c/w venlafaxine and as needed hydroxyzine.     CT chest => bronchitis     Indeterminate hypodensity anteriorly in the midpole of right kidney on CT - obtain renal US -> right kidney cyst 1.3 cm        Code status: Full       PENDING TEST RESULTS:   At the time of discharge the following test results are still pending: none    FOLLOW UP APPOINTMENTS:    Neurologist    ADDITIONAL CARE RECOMMENDATIONS:   Continue all your home medications  Follow up with your neurologist    DIET: Regular    ACTIVITY: activity as tolerated    WOUND CARE: none    EQUIPMENT needed: none      DISCHARGE MEDICATIONS:     Medication List        CONTINUE taking these medications      Belsomra 20 MG Tabs  Generic drug: Suvorexant     Botox 200 units injection  Generic drug: Onabotulinumtoxin A     Celecoxib 120 MG/4.8ML Soln     D3 2000 PO     diphenhydrAMINE 50 MG/ML injection  Commonly known as: BENADRYL     ketorolac 60 MG/2ML injection  Commonly known as: TORADOL     Lasmiditan Succinate 100 MG Tabs     levETIRAcetam 250 MG tablet  Commonly known as: KEPPRA     lidocaine 4 % solution  Commonly known as: XYLOCAINE     loratadine 10 MG tablet  Commonly known as: CLARITIN     Magnesium Glycinate 100 MG Caps     metFORMIN 1000 MG tablet  Commonly known as: GLUCOPHAGE     methocarbamol 500 MG tablet  Commonly known as: ROBAXIN

## 2024-04-17 NOTE — CARE COORDINATION
Transition of Care Plan:    Home with family/friends  - DME: RW ordered through [employer]     Transport: Family    RUR: 9%  Prior Level of Functioning: Independent  Disposition: Home  Follow up appointments: PCP, Neurology   DME needed: RW - ordered  Transportation at discharge: Friend  Caregiver Contact: SonMaciel [phone]   Discharge Caregiver contacted prior to discharge?   Care Conference needed? no  Barriers to discharge: Medical, DC today? Pending neuro clearance    RW ordered through [employer] via [employer] for bedside delivery.    NISSA Watson (Ally).     
Prior To Admission None   Potential Assistance Needed Durable Medical Equipment   Potential DME Needed Walker   DME Ordered? Walker   Patient expects to be discharged to: Apartment   Services At/After Discharge   Confirm Follow Up Transport Family     Zelda Sanders) NISSA Olvera.

## 2024-04-17 NOTE — PLAN OF CARE
Problem: Discharge Planning  Goal: Discharge to home or other facility with appropriate resources  4/15/2024 2350 by Dayana Rivers RN  Outcome: Not Progressing  4/15/2024 1555 by Mela Mak RN  Outcome: Progressing  Flowsheets (Taken 4/15/2024 1525)  Discharge to home or other facility with appropriate resources: Identify barriers to discharge with patient and caregiver     Problem: Pain  Goal: Verbalizes/displays adequate comfort level or baseline comfort level  4/15/2024 2350 by Dayana Rivers RN  Outcome: Not Progressing  4/15/2024 1555 by Mela Mak RN  Outcome: Progressing     Problem: Discharge Planning  Goal: Discharge to home or other facility with appropriate resources  4/15/2024 2350 by Dayana Rivers RN  Outcome: Not Progressing  4/15/2024 1555 by Mela Mak RN  Outcome: Progressing  Flowsheets (Taken 4/15/2024 1525)  Discharge to home or other facility with appropriate resources: Identify barriers to discharge with patient and caregiver     Problem: Pain  Goal: Verbalizes/displays adequate comfort level or baseline comfort level  4/15/2024 2350 by Dayana Rivers RN  Outcome: Not Progressing  4/15/2024 1555 by Mela Mak RN  Outcome: Progressing     
  Problem: Discharge Planning  Goal: Discharge to home or other facility with appropriate resources  4/16/2024 1059 by Frances Cross  Outcome: Progressing  4/15/2024 2350 by Dayana Rivers, RN  Outcome: Not Progressing     Problem: Pain  Goal: Verbalizes/displays adequate comfort level or baseline comfort level  4/16/2024 1059 by Frances Cross  Outcome: Progressing  4/15/2024 2350 by Dayana Rivers, RN  Outcome: Not Progressing     
  Problem: Discharge Planning  Goal: Discharge to home or other facility with appropriate resources  4/16/2024 2230 by Dayana Rivers RN  Outcome: Progressing  4/16/2024 1059 by Frances Cross  Outcome: Progressing  Flowsheets (Taken 4/16/2024 0800)  Discharge to home or other facility with appropriate resources:   Identify barriers to discharge with patient and caregiver   Arrange for needed discharge resources and transportation as appropriate     Problem: Pain  Goal: Verbalizes/displays adequate comfort level or baseline comfort level  4/16/2024 2230 by Dayana Rivers RN  Outcome: Progressing  4/16/2024 1059 by Frances Cross  Outcome: Progressing     Problem: Safety - Adult  Goal: Free from fall injury  4/16/2024 2230 by Dayana Rivers RN  Outcome: Progressing  4/16/2024 1059 by Frances Cross  Outcome: Progressing     Problem: Occupational Therapy - Adult  Goal: By Discharge: Performs self-care activities at highest level of function for planned discharge setting.  See evaluation for individualized goals.  Description: FUNCTIONAL STATUS PRIOR TO ADMISSION:  Patient was ambulatory using RW; reports only during flare ups   ,  ,  ,  ,  ,  ,  ,  , Ambulation Assistance: Needs assistance (would use a cane, RW, or wheelchair depending on RLE weakness),  ,       HOME SUPPORT: Patient lived son? Reports after [place] she will be living on [facility] couch before moving to [place].    Occupational Therapy Goals:  Initiated 4/16/2024  1.  Patient will perform self-feeding with [place] within 7 day(s).  2.  Patient will perform grooming with Supervision within 7 day(s).  3.  Patient will perform bathing with Supervision within 7 day(s).  4.  Patient will perform toilet transfers with Supervision  within 7 day(s).  5.  Patient will perform all aspects of toileting with Supervision within 7 day(s).  6.  Patient will participate in upper extremity therapeutic exercise/activities with 
  Problem: Discharge Planning  Goal: Discharge to home or other facility with appropriate resources  Outcome: Adequate for Discharge     Problem: Pain  Goal: Verbalizes/displays adequate comfort level or baseline comfort level  Outcome: Adequate for Discharge     Problem: Safety - Adult  Goal: Free from fall injury  Outcome: Adequate for Discharge     
  Problem: Discharge Planning  Goal: Discharge to home or other facility with appropriate resources  Outcome: Progressing  Flowsheets (Taken 4/15/2024 1525)  Discharge to home or other facility with appropriate resources: Identify barriers to discharge with patient and caregiver     Problem: Pain  Goal: Verbalizes/displays adequate comfort level or baseline comfort level  Outcome: Progressing     Problem: Safety - Adult  Goal: Free from fall injury  Outcome: Progressing     
  Problem: Occupational Therapy - Adult  Goal: By Discharge: Performs self-care activities at highest level of function for planned discharge setting.  See evaluation for individualized goals.  Description: FUNCTIONAL STATUS PRIOR TO ADMISSION:  Patient was ambulatory using RW; reports only during flare ups   ,  ,  ,  ,  ,  ,  ,  , Ambulation Assistance: Needs assistance (would use a cane, RW, or wheelchair depending on RLE weakness),  ,       HOME SUPPORT: Patient lived son? Reports after [place] she will be living on friends couch before moving to [place].    Occupational Therapy Goals:  Initiated 4/16/2024  1.  Patient will perform self-feeding with [place] within 7 day(s).  2.  Patient will perform grooming with Supervision within 7 day(s).  3.  Patient will perform bathing with Supervision within 7 day(s).  4.  Patient will perform toilet transfers with Supervision  within 7 day(s).  5.  Patient will perform all aspects of toileting with Supervision within 7 day(s).  6.  Patient will participate in upper extremity therapeutic exercise/activities with Supervision for 5 minutes within 7 day(s).    7.  Patient will utilize energy conservation techniques during functional activities with verbal cues within 7 day(s).   Outcome: Progressing   OCCUPATIONAL THERAPY EVALUATION    Patient: Katja Rosado (43 y.o. female)  Date: 4/16/2024  Primary Diagnosis: Stroke-like symptom [R29.90]         Precautions:                    ASSESSMENT :  The patient is limited by decreased functional mobility, independence in ADLs, high-level IADLs, ROM, strength, sensation, body mechanics, activity tolerance, endurance, coordination, balance, posture, fine-motor control. Per chart review, pt was admitted with complex migraine with stroke rule out, pmhx of chronic hemiplegic migraine. Pt reports being in the process of moving to [place] and planning on living on friends couch at [place].     Upon arrival pt was received semi-reclined in bed, agreeable 
Mild  Responsive: WFL  Conversation: Mild  Effective Techniques: Overarticulate;Provide extra time;Word retrived strategies     Neuro-Linguistics:  Attention  Attention: Within Functional Limits  Memory  Memory: Exceptions to WFL  Daily Routines: WFL  Long-term Memory: WFL  People Encountered: WFL  Short-term Memory: WFL  Working Memory: WFL  Problem Solving  Problem Solving: Within Functional Limits     Abstract Reasoning  Abstract Reasoning: Within Functional Limits  Safety/Judgment  Safety/Judgment: Within Functional Limits    Voice:   WNL    Respiratory Status/Airway:  Room air    Functional Oral Intake Scale (FOIS): 7--Total oral diet with no restrictions    After treatment:   Patient left in no apparent distress in bed, Call bell left within reach, Nursing notified, and Bed alarm activated  COMMUNICATION/EDUCATION:   Patient was educated regarding her deficit(s) of communication and strategies.  She demonstrated Good understanding as evidenced by Verbalizing understanding.  The patient's plan of care including recommendations, planned interventions, and recommended diet changes were discussed with: Registered nurse  Patient/family have participated as able in goal setting and plan of care    Thank you,  Jannette Estrada M.Ed, CCC-SLP  Speech Language Pathologist  Minutes: 27     Problem: SLP Adult - Impaired Communication  Goal: By Discharge: Demonstrates communication skills at highest level of function for planned discharge setting.  See evaluation for individualized goals.  Description: 1. Patient will independently use speech and communication strategies to max functional communication at conversation level  Outcome: Progressing  
      56=Maximum possible score;   0-20=High fall risk  21-40=Moderate fall risk   41-56=Low fall risk     Pain Rating:  Pt reported low back pain, but did not rate  Pain Intervention(s):       Activity Tolerance:   Good    After treatment:   Patient left in no apparent distress in bed, Call bell within reach, and Side rails x3    COMMUNICATION/EDUCATION:   The patient's plan of care was discussed with: occupational therapist and registered nurse    Patient Education  Education Given To: Patient  Education Provided: Role of Therapy;Plan of Care  Education Method: Verbal  Barriers to Learning: None  Education Outcome: Verbalized understanding    Thank you for this referral.  Makayla Malcolm, PT, DPT  Minutes: 16

## 2024-05-31 ENCOUNTER — APPOINTMENT (OUTPATIENT)
Dept: GYNECOLOGIC ONCOLOGY | Facility: CLINIC | Age: 44
End: 2024-05-31

## 2024-09-13 NOTE — SBIRT NOTE ADULT - NSSBIRTNALOXDUR_GEN_A_CORE
10 Photo Preface (Leave Blank If You Do Not Want): Photographs were obtained today Detail Level: Zone

## 2024-09-20 ENCOUNTER — APPOINTMENT (OUTPATIENT)
Dept: COLORECTAL SURGERY | Facility: CLINIC | Age: 44
End: 2024-09-20
